# Patient Record
Sex: FEMALE | Race: BLACK OR AFRICAN AMERICAN | NOT HISPANIC OR LATINO | Employment: OTHER | URBAN - METROPOLITAN AREA
[De-identification: names, ages, dates, MRNs, and addresses within clinical notes are randomized per-mention and may not be internally consistent; named-entity substitution may affect disease eponyms.]

---

## 2021-11-22 ENCOUNTER — TELEPHONE (OUTPATIENT)
Dept: DIABETES | Facility: CLINIC | Age: 61
End: 2021-11-22
Payer: MEDICAID

## 2021-11-24 ENCOUNTER — TELEPHONE (OUTPATIENT)
Dept: DIABETES | Facility: CLINIC | Age: 61
End: 2021-11-24
Payer: MEDICAID

## 2021-12-09 ENCOUNTER — OFFICE VISIT (OUTPATIENT)
Dept: DIABETES | Facility: CLINIC | Age: 61
End: 2021-12-09
Payer: MEDICAID

## 2021-12-09 VITALS
HEIGHT: 62 IN | WEIGHT: 204.38 LBS | DIASTOLIC BLOOD PRESSURE: 93 MMHG | HEART RATE: 87 BPM | SYSTOLIC BLOOD PRESSURE: 196 MMHG | BODY MASS INDEX: 37.61 KG/M2

## 2021-12-09 DIAGNOSIS — E78.5 HYPERLIPIDEMIA WITH TARGET LDL LESS THAN 70: ICD-10-CM

## 2021-12-09 DIAGNOSIS — Z96.41 INSULIN PUMP IN PLACE: ICD-10-CM

## 2021-12-09 DIAGNOSIS — E03.9 HYPOTHYROIDISM, UNSPECIFIED TYPE: ICD-10-CM

## 2021-12-09 DIAGNOSIS — E11.65 UNCONTROLLED TYPE 2 DIABETES MELLITUS WITH HYPERGLYCEMIA, WITH LONG-TERM CURRENT USE OF INSULIN: Primary | ICD-10-CM

## 2021-12-09 DIAGNOSIS — I10 ESSENTIAL HYPERTENSION: ICD-10-CM

## 2021-12-09 DIAGNOSIS — E55.9 VITAMIN D DEFICIENCY: ICD-10-CM

## 2021-12-09 DIAGNOSIS — Z79.4 UNCONTROLLED TYPE 2 DIABETES MELLITUS WITH HYPERGLYCEMIA, WITH LONG-TERM CURRENT USE OF INSULIN: Primary | ICD-10-CM

## 2021-12-09 DIAGNOSIS — R74.8 ELEVATED LIVER ENZYMES: ICD-10-CM

## 2021-12-09 PROBLEM — J45.909 ASTHMA: Status: ACTIVE | Noted: 2021-12-09

## 2021-12-09 PROBLEM — J44.9 CHRONIC OBSTRUCTIVE PULMONARY DISEASE: Status: ACTIVE | Noted: 2018-02-15

## 2021-12-09 PROBLEM — E11.42 DIABETIC PERIPHERAL NEUROPATHY: Status: ACTIVE | Noted: 2018-02-15

## 2021-12-09 LAB — GLUCOSE SERPL-MCNC: 319 MG/DL (ref 70–110)

## 2021-12-09 PROCEDURE — 99999 PR PBB SHADOW E&M-EST. PATIENT-LVL IV: ICD-10-PCS | Mod: PBBFAC,,, | Performed by: NURSE PRACTITIONER

## 2021-12-09 PROCEDURE — 99999 PR PBB SHADOW E&M-EST. PATIENT-LVL IV: CPT | Mod: PBBFAC,,, | Performed by: NURSE PRACTITIONER

## 2021-12-09 PROCEDURE — 95251 PR GLUCOSE MONITOR, 72 HOUR, PHYS INTERP: ICD-10-PCS | Mod: ,,, | Performed by: NURSE PRACTITIONER

## 2021-12-09 PROCEDURE — 99214 OFFICE O/P EST MOD 30 MIN: CPT | Mod: PBBFAC | Performed by: NURSE PRACTITIONER

## 2021-12-09 PROCEDURE — 99215 PR OFFICE/OUTPT VISIT, EST, LEVL V, 40-54 MIN: ICD-10-PCS | Mod: S$PBB,,, | Performed by: NURSE PRACTITIONER

## 2021-12-09 PROCEDURE — 95251 CONT GLUC MNTR ANALYSIS I&R: CPT | Mod: ,,, | Performed by: NURSE PRACTITIONER

## 2021-12-09 PROCEDURE — 82962 GLUCOSE BLOOD TEST: CPT | Mod: PBBFAC | Performed by: NURSE PRACTITIONER

## 2021-12-09 PROCEDURE — 99215 OFFICE O/P EST HI 40 MIN: CPT | Mod: S$PBB,,, | Performed by: NURSE PRACTITIONER

## 2021-12-09 RX ORDER — PREGABALIN 75 MG/1
75 CAPSULE ORAL NIGHTLY
COMMUNITY
Start: 2021-10-28

## 2021-12-09 RX ORDER — INSULIN ASPART 100 [IU]/ML
INJECTION, SOLUTION INTRAVENOUS; SUBCUTANEOUS
COMMUNITY
Start: 2021-09-18 | End: 2021-12-09 | Stop reason: SDUPTHER

## 2021-12-09 RX ORDER — FLASH GLUCOSE SENSOR
KIT MISCELLANEOUS
COMMUNITY
End: 2023-10-05 | Stop reason: ALTCHOICE

## 2021-12-09 RX ORDER — CETIRIZINE HYDROCHLORIDE 5 MG/1
5 TABLET ORAL DAILY
COMMUNITY
Start: 2021-10-10

## 2021-12-09 RX ORDER — PROMETHAZINE HYDROCHLORIDE 25 MG/1
TABLET ORAL
COMMUNITY
Start: 2021-07-28

## 2021-12-09 RX ORDER — LINACLOTIDE 145 UG/1
145 CAPSULE, GELATIN COATED ORAL DAILY
COMMUNITY
Start: 2021-09-09

## 2021-12-09 RX ORDER — LEVOTHYROXINE SODIUM 25 UG/1
CAPSULE ORAL
COMMUNITY
Start: 2021-08-05 | End: 2021-12-09 | Stop reason: ALTCHOICE

## 2021-12-09 RX ORDER — ASPIRIN 81 MG/1
81 TABLET ORAL
COMMUNITY

## 2021-12-09 RX ORDER — TRIAMCINOLONE ACETONIDE 1 MG/G
CREAM TOPICAL
COMMUNITY
Start: 2021-11-03

## 2021-12-09 RX ORDER — LEVOTHYROXINE SODIUM 50 UG/1
50 TABLET ORAL
Qty: 30 TABLET | Refills: 5 | Status: SHIPPED | OUTPATIENT
Start: 2021-12-09 | End: 2022-03-11

## 2021-12-09 RX ORDER — DICLOFENAC SODIUM 10 MG/G
GEL TOPICAL
COMMUNITY
Start: 2021-11-18

## 2021-12-09 RX ORDER — LIDOCAINE 50 MG/G
PATCH TOPICAL
COMMUNITY
Start: 2021-10-28

## 2021-12-09 RX ORDER — RANOLAZINE 500 MG/1
500 TABLET, EXTENDED RELEASE ORAL 2 TIMES DAILY
COMMUNITY
Start: 2021-11-09

## 2021-12-09 RX ORDER — ATORVASTATIN CALCIUM 10 MG/1
10 TABLET, FILM COATED ORAL DAILY
COMMUNITY
Start: 2021-10-11 | End: 2022-01-11 | Stop reason: SDUPTHER

## 2021-12-09 RX ORDER — LOSARTAN POTASSIUM AND HYDROCHLOROTHIAZIDE 12.5; 5 MG/1; MG/1
1 TABLET ORAL DAILY
COMMUNITY
Start: 2021-10-03

## 2021-12-09 RX ORDER — HYDROXYZINE PAMOATE 50 MG/1
CAPSULE ORAL
COMMUNITY
Start: 2021-11-04

## 2021-12-09 RX ORDER — METOPROLOL SUCCINATE 25 MG/1
25 TABLET, EXTENDED RELEASE ORAL DAILY
COMMUNITY
Start: 2021-11-09

## 2021-12-09 RX ORDER — FLUTICASONE PROPIONATE 50 UG/1
1 POWDER, METERED RESPIRATORY (INHALATION)
COMMUNITY

## 2021-12-09 RX ORDER — BLOOD-GLUCOSE CONTROL, NORMAL
EACH MISCELLANEOUS
Qty: 100 EACH | Refills: 11 | Status: SHIPPED | OUTPATIENT
Start: 2021-12-09

## 2021-12-09 RX ORDER — CLOPIDOGREL BISULFATE 75 MG/1
75 TABLET ORAL DAILY
COMMUNITY
Start: 2021-11-09

## 2021-12-09 RX ORDER — HYDROGEN PEROXIDE 3 %
SOLUTION, NON-ORAL MISCELLANEOUS
COMMUNITY
Start: 2021-11-12

## 2021-12-09 RX ORDER — ALBUTEROL SULFATE 5 MG/ML
2.5 SOLUTION RESPIRATORY (INHALATION) EVERY 6 HOURS PRN
COMMUNITY

## 2021-12-09 RX ORDER — CYCLOBENZAPRINE HCL 10 MG
10 TABLET ORAL 3 TIMES DAILY
COMMUNITY
Start: 2021-11-01

## 2021-12-09 RX ORDER — ASPIRIN 325 MG
50000 TABLET, DELAYED RELEASE (ENTERIC COATED) ORAL
COMMUNITY
Start: 2021-11-09 | End: 2021-12-09 | Stop reason: SDUPTHER

## 2021-12-09 RX ORDER — TRAMADOL HYDROCHLORIDE 50 MG/1
50 TABLET ORAL 3 TIMES DAILY
COMMUNITY
Start: 2021-11-18

## 2021-12-09 RX ORDER — TIOTROPIUM BROMIDE 18 UG/1
1 CAPSULE ORAL; RESPIRATORY (INHALATION) DAILY
COMMUNITY
Start: 2021-11-09

## 2021-12-09 RX ORDER — FLUTICASONE PROPIONATE AND SALMETEROL XINAFOATE 230; 21 UG/1; UG/1
AEROSOL, METERED RESPIRATORY (INHALATION)
COMMUNITY
Start: 2021-11-10

## 2021-12-09 RX ORDER — AMLODIPINE BESYLATE 5 MG/1
5 TABLET ORAL DAILY
COMMUNITY
Start: 2021-10-27 | End: 2023-10-05 | Stop reason: ALTCHOICE

## 2021-12-09 RX ORDER — PANTOPRAZOLE SODIUM 40 MG/1
40 TABLET, DELAYED RELEASE ORAL DAILY
COMMUNITY
Start: 2021-11-13 | End: 2024-02-20 | Stop reason: CLARIF

## 2021-12-09 RX ORDER — ASPIRIN 325 MG
50000 TABLET, DELAYED RELEASE (ENTERIC COATED) ORAL
Qty: 12 CAPSULE | Refills: 1 | Status: SHIPPED | OUTPATIENT
Start: 2021-12-09 | End: 2022-08-19 | Stop reason: SDUPTHER

## 2021-12-09 RX ORDER — BLOOD-GLUCOSE CONTROL, NORMAL
1 EACH MISCELLANEOUS
COMMUNITY
Start: 2021-01-26 | End: 2021-12-09 | Stop reason: ALTCHOICE

## 2021-12-09 RX ORDER — INSULIN ASPART 100 [IU]/ML
INJECTION, SOLUTION INTRAVENOUS; SUBCUTANEOUS
Qty: 40 ML | Refills: 5 | Status: SHIPPED | OUTPATIENT
Start: 2021-12-09 | End: 2022-01-25 | Stop reason: SDUPTHER

## 2022-01-11 DIAGNOSIS — E78.5 HYPERLIPIDEMIA WITH TARGET LDL LESS THAN 70: Primary | ICD-10-CM

## 2022-01-11 RX ORDER — ATORVASTATIN CALCIUM 10 MG/1
10 TABLET, FILM COATED ORAL DAILY
Qty: 90 TABLET | Refills: 1 | Status: SHIPPED | OUTPATIENT
Start: 2022-01-11 | End: 2022-06-27

## 2022-01-20 ENCOUNTER — TELEPHONE (OUTPATIENT)
Dept: DIABETES | Facility: CLINIC | Age: 62
End: 2022-01-20
Payer: MEDICAID

## 2022-01-20 NOTE — TELEPHONE ENCOUNTER
----- Message from Flo John sent at 1/20/2022  7:55 AM CST -----  Contact: JD WHITLEY [[id number]]  ./Type:  Needs Medical Advice    Who Called: JD WHITLEY [[id number]]  Symptoms (please be specific):   How long has patient had these symptoms:   Pharmacy name and phone #:   Would the patient rather a call back or a response via [facility]?   Both     Best Call Back Number:   [phone] (home) [phone] (work)  Additional Information:  Pt is requesting  a call back from the nurse in regards to the pt knowing  if her lab orders were sent to [facility] pt is on  her way to the lab now please

## 2022-01-25 DIAGNOSIS — Z79.4 UNCONTROLLED TYPE 2 DIABETES MELLITUS WITH HYPERGLYCEMIA, WITH LONG-TERM CURRENT USE OF INSULIN: ICD-10-CM

## 2022-01-25 DIAGNOSIS — E11.65 UNCONTROLLED TYPE 2 DIABETES MELLITUS WITH HYPERGLYCEMIA, WITH LONG-TERM CURRENT USE OF INSULIN: ICD-10-CM

## 2022-01-25 RX ORDER — INSULIN ASPART 100 [IU]/ML
INJECTION, SOLUTION INTRAVENOUS; SUBCUTANEOUS
Qty: 40 ML | Refills: 5 | Status: SHIPPED | OUTPATIENT
Start: 2022-01-25 | End: 2023-02-22

## 2022-01-26 ENCOUNTER — PATIENT MESSAGE (OUTPATIENT)
Dept: DIABETES | Facility: CLINIC | Age: 62
End: 2022-01-26
Payer: MEDICAID

## 2022-03-07 ENCOUNTER — PATIENT MESSAGE (OUTPATIENT)
Dept: DIABETES | Facility: CLINIC | Age: 62
End: 2022-03-07
Payer: MEDICAID

## 2022-03-07 DIAGNOSIS — Z96.41 INSULIN PUMP IN PLACE: ICD-10-CM

## 2022-03-07 DIAGNOSIS — Z79.4 UNCONTROLLED TYPE 2 DIABETES MELLITUS WITH HYPERGLYCEMIA, WITH LONG-TERM CURRENT USE OF INSULIN: Primary | ICD-10-CM

## 2022-03-07 DIAGNOSIS — E03.9 HYPOTHYROIDISM, UNSPECIFIED TYPE: ICD-10-CM

## 2022-03-07 DIAGNOSIS — E11.65 UNCONTROLLED TYPE 2 DIABETES MELLITUS WITH HYPERGLYCEMIA, WITH LONG-TERM CURRENT USE OF INSULIN: Primary | ICD-10-CM

## 2022-03-07 DIAGNOSIS — R74.8 ELEVATED LIVER ENZYMES: ICD-10-CM

## 2022-03-07 DIAGNOSIS — E78.5 HYPERLIPIDEMIA WITH TARGET LDL LESS THAN 70: ICD-10-CM

## 2022-03-07 DIAGNOSIS — I10 ESSENTIAL HYPERTENSION: ICD-10-CM

## 2022-03-07 DIAGNOSIS — E55.9 VITAMIN D DEFICIENCY: ICD-10-CM

## 2022-03-09 ENCOUNTER — TELEPHONE (OUTPATIENT)
Dept: DIABETES | Facility: CLINIC | Age: 62
End: 2022-03-09
Payer: MEDICAID

## 2022-03-09 ENCOUNTER — OFFICE VISIT (OUTPATIENT)
Dept: DIABETES | Facility: CLINIC | Age: 62
End: 2022-03-09
Payer: MEDICAID

## 2022-03-09 ENCOUNTER — PATIENT MESSAGE (OUTPATIENT)
Dept: DIABETES | Facility: CLINIC | Age: 62
End: 2022-03-09

## 2022-03-09 DIAGNOSIS — R74.8 ELEVATED LIVER ENZYMES: ICD-10-CM

## 2022-03-09 DIAGNOSIS — Z79.4 UNCONTROLLED TYPE 2 DIABETES MELLITUS WITH HYPERGLYCEMIA, WITH LONG-TERM CURRENT USE OF INSULIN: Primary | ICD-10-CM

## 2022-03-09 DIAGNOSIS — E78.5 HYPERLIPIDEMIA WITH TARGET LDL LESS THAN 70: ICD-10-CM

## 2022-03-09 DIAGNOSIS — E03.9 HYPOTHYROIDISM, UNSPECIFIED TYPE: ICD-10-CM

## 2022-03-09 DIAGNOSIS — E11.65 UNCONTROLLED TYPE 2 DIABETES MELLITUS WITH HYPERGLYCEMIA, WITH LONG-TERM CURRENT USE OF INSULIN: Primary | ICD-10-CM

## 2022-03-09 DIAGNOSIS — I10 ESSENTIAL HYPERTENSION: ICD-10-CM

## 2022-03-09 DIAGNOSIS — E55.9 VITAMIN D DEFICIENCY: ICD-10-CM

## 2022-03-09 DIAGNOSIS — Z96.41 INSULIN PUMP IN PLACE: ICD-10-CM

## 2022-03-09 PROCEDURE — 1160F RVW MEDS BY RX/DR IN RCRD: CPT | Mod: CPTII,95,, | Performed by: NURSE PRACTITIONER

## 2022-03-09 PROCEDURE — 99214 OFFICE O/P EST MOD 30 MIN: CPT | Mod: 95,,, | Performed by: NURSE PRACTITIONER

## 2022-03-09 PROCEDURE — 1159F MED LIST DOCD IN RCRD: CPT | Mod: CPTII,95,, | Performed by: NURSE PRACTITIONER

## 2022-03-09 PROCEDURE — 1159F PR MEDICATION LIST DOCUMENTED IN MEDICAL RECORD: ICD-10-PCS | Mod: CPTII,95,, | Performed by: NURSE PRACTITIONER

## 2022-03-09 PROCEDURE — 3052F HG A1C>EQUAL 8.0%<EQUAL 9.0%: CPT | Mod: CPTII,95,, | Performed by: NURSE PRACTITIONER

## 2022-03-09 PROCEDURE — 3052F PR MOST RECENT HEMOGLOBIN A1C LEVEL 8.0 - < 9.0%: ICD-10-PCS | Mod: CPTII,95,, | Performed by: NURSE PRACTITIONER

## 2022-03-09 PROCEDURE — 1160F PR REVIEW ALL MEDS BY PRESCRIBER/CLIN PHARMACIST DOCUMENTED: ICD-10-PCS | Mod: CPTII,95,, | Performed by: NURSE PRACTITIONER

## 2022-03-09 PROCEDURE — 99214 PR OFFICE/OUTPT VISIT, EST, LEVL IV, 30-39 MIN: ICD-10-PCS | Mod: 95,,, | Performed by: NURSE PRACTITIONER

## 2022-03-09 RX ORDER — INSULIN GLARGINE 100 [IU]/ML
INJECTION, SOLUTION SUBCUTANEOUS
Qty: 15 ML | Refills: 5 | Status: SHIPPED | OUTPATIENT
Start: 2022-03-09

## 2022-03-09 RX ORDER — PEN NEEDLE, DIABETIC 30 GX3/16"
NEEDLE, DISPOSABLE MISCELLANEOUS
Qty: 50 EACH | Refills: 2 | Status: SHIPPED | OUTPATIENT
Start: 2022-03-09 | End: 2023-10-05 | Stop reason: SDUPTHER

## 2022-03-09 RX ORDER — SYRINGE AND NEEDLE,INSULIN,1ML 31GX15/64"
SYRINGE, EMPTY DISPOSABLE MISCELLANEOUS
Qty: 100 EACH | Refills: 2 | Status: SHIPPED | OUTPATIENT
Start: 2022-03-09

## 2022-03-09 NOTE — PROGRESS NOTES
The patient location is: [place], [place]  The chief complaint leading to consultation is: diabetes management follow up     Visit type: audiovisual    Face to Face time with patient: 15 minutes  30 minutes of total time spent on the encounter, which includes face to face time and non-face to face time preparing to see the patient (eg, review of tests), Obtaining and/or reviewing separately obtained history, Documenting clinical information in the electronic or other health record, Independently interpreting results (not separately reported) and communicating results to the patient/family/caregiver, or Care coordination (not separately reported).     Each patient to whom he or she provides medical services by telemedicine is:  (1) informed of the relationship between the physician and patient and the respective role of any other health care provider with respect to management of the patient; and (2) notified that he or she may decline to receive medical services by telemedicine and may withdraw from such care at any time.    Notes:     Subjective:         Patient ID: Victoria Plaza is a 61 y.o. female.  Patient's current PCP is [facility]     Chief Complaint: Diabetes Mellitus    HPI  Victoria Plaza is a 61 y.o. Black or  female presenting for a new consult with me, previously seen by myself at [facility] for diabetes. Patient has been diagnosed with type 2 diabetes since age 34 .    Comprehensive diabetes education completed through [facility] 2018.    CURRENT DM MEDICATIONS:   Novolog per Tandem TSlim x2 insulin pump- since 09/2018 (Supplies-[facility])  12a: 2.3   6a: 2.3  12p- 1.8  2p: 2.3  ISF   12a: 28  6a: 25  12p: 25  2p: 28  Carb ratio  12a: 3.6  6a: 3.6  12p: 3.4   2p: 3.4   Target 150   IOB 4 hours    Past failed treatment include: Humulin R,Lantus,Basaglar - Failure to control DM    Blood glucose testing  Continuous per Stu/True Metrix  Preferred lab: [facility]     Any episodes of  hypoglycemia? 0% per pump download    Complications related to diabetes: nephropathy, retinopathy, autonomic neuropathy and peripheral neuropathy    Her blood sugar in the clinic today was:   Lab Results   Component Value Date    POCGLU 319 (A) 12/09/2021     Victoria Plaza presents today for follow up visit to discuss diabetes management. No Stu download for this visit - her daughter who is also on the video call will re-try later today after her computer finishes updating. Per pump download, for the last 2 weeks average glucose of 265 mg/dL with a glucose range of  mg/dL. Basal needs account for 63%, food bolus 23%, and correction bolus needs 14%. She enters her BGs on average 3.2 times/day. Changing site/cartridge/tubing every 1-1.5 days. High glycemic variability continues. Corrections when given through the pump are not returning BGs to goal- plan to adjust ISF today. She did complete her blood work this AM at [facility]- results not ready at time of video visit.    She historically has declined Dexcom that communicates with her pump.    CKD II- Followed by renal routinely, Dr. Townsend. Kidney function stable.    GI -Gastroparesis- Dr. Jb Hyde. On Reglan. Chronic intermittent nausea and vomiting.     Vitamin D deficiency- Taking 50,000 international units twice monthly. Vitamin D level stable.    Hyperlipidemia-followed by cardiology, Dr. Sanches. She takes Lipitor 10 mg daily.     Hypothyroidism- Taking Synthroid 50 mcg daily- increased at last visit. Await lab work re-check.    Current diet: Carb counting  Activity Level: No structured exercise- limited due to chronic angina and COPD    Lab Results   Component Value Date    HGBA1C 8.6 (H) 09/01/2021    HGBA1C 10.0 (H) 05/11/2021    HGBA1C 9.2 (H) 12/10/2020     STANDARDS OF CARE  Diabetes Management Status    Statin: Taking  ACE/ARB: Taking    Screening or Prevention Patient's value Goal Complete/Controlled?   HgA1C Testing and Control   Lab Results    Component Value Date    HGBA1C 8.6 (H) 09/01/2021      Annually/Less than 8% No   Lipid profile : 09/01/2021 Annually No   LDL control No results found for: LDLCALC Annually/Less than 100 mg/dl  No   Nephropathy screening No results found for: LABMICR  No results found for: PROTEINUA  No results found for: UTPCR   Annually No   Blood pressure BP Readings from Last 1 Encounters:   12/09/21 (!) 196/93    Less than 140/90 No   Dilated retinal exam : 01/04/2022 Annually Yes   Foot exam   : 12/09/2021 Annually Yes       Labs reviewed and are noted below. She completed labs this morning -results not ready for time of visit.    No results found for: WBC, HGB, HCT, PLT, CHOL, TRIG, HDL, LDLCALC, ALT, AST, NA, K, CL, ANIONGAP, CREATININE, ESTGFRAFRICA, EGFRNONAA, BUN, CO2, TSH, PSA, INR, GLU, UTPCR, MICROALBUR  No results found for: GLUTAMICACID, CPEPTIDE, T3FREE, FREET4, TSH, THYROPEROXID, THYGLBTUM, THGABSCRN, TESTOSTERONE, TOTALTESTOST, IRON, TIBC, FERRITIN, SATURATEDIRO, QBNPCLRM36, PTH, CALCIUM, CAION, PHOS  No results found for: CPEPTIDE  No results found for: GLUTAMICACID  No results found for: GLU, ANIONGAP, ESTGFRAFRICA, EGFRNONAA    The following portions of the patient's history were reviewed and updated as appropriate: allergies, current medications, past family history, past medical history, past social history, past surgical history and problem list.    Review of patient's allergies indicates:   Allergen Reactions    Cephalexin Itching and Shortness Of Breath    Iodine and iodide containing products Hives, Itching and Shortness Of Breath    Latex, natural rubber Hives, Itching, Shortness Of Breath and Swelling     BREAKS OUT IN RASH      Monosodium glutamate Hives, Itching, Shortness Of Breath and Swelling     CAN'T BREATHE      Propofol analogues Shortness Of Breath    Adhesive Hives and Itching     Social History     Socioeconomic History    Marital status: Single   Tobacco Use    Smoking status:  "Never Smoker    Smokeless tobacco: Never Used   Substance and Sexual Activity    Alcohol use: Not Currently    Drug use: Never    Sexual activity: Not Currently     History reviewed. No pertinent past medical history.    REVIEW OF SYSTEMS:  Eyes History of stable DR.   Cardiovascular: History of HTN and hyperlipidemia.  GI: History of gastroparesis.  Neuro: Autonomic neuropathy.   PSYCH: No tobacco use.  ENDO: See HPI.        Objective:      There were no vitals filed for this visit.  RESPIRATORY: No respiratory distress  NEUROLOGIC: Cranial nerves II-XII grossly intact.   PSYCHIATRIC: Alert & oriented x3. Normal mood and affect.  FOOT: UTD    Assessment:       1. Uncontrolled type 2 diabetes mellitus with hyperglycemia, with long-term current use of insulin    2. Insulin pump in place    3. Hyperlipidemia with target LDL less than 70    4. Vitamin D deficiency    5. Elevated liver enzymes    6. Hypothyroidism, unspecified type    7. Essential hypertension        Plan:   Victoria was seen today for diabetes mellitus.    Diagnoses and all orders for this visit:    Uncontrolled type 2 diabetes mellitus with hyperglycemia, with long-term current use of insulin-Chronic  -     pen needle, diabetic (BD ULTRA-FINE JYOTI PEN NEEDLE) 32 gauge x 5/32" Ndle; In the event of pump failure only, for use with Lantus daily.  -     insulin syringe-needle U-100 0.3 mL 31 gauge x 15/64" Syrg; In the event of pump failure only, for use with Novolog 3-4 times daily.  -     insulin (LANTUS SOLOSTAR U-100 INSULIN) glargine 100 units/mL (3mL) SubQ pen; In the event of pump failure only, remove pump and start Lantus 60 units daily. Do not re-start pump until the time Lantus would have been due.    -ISF settings adjusted. Patient to try again to re-upload Stu data from home. Her daughter will work on this.     Pump Back Up Plan Only:    In the event of pump failure, remove your pump and start a long-acting insulin (such as Lantus, " "Basaglar, etc-whichever is preferred on your plan) at 60 units once daily. This will cover the loss of basal needs through your pump. Do not re-start your pump until the time your long-acting insulin would have been due.    Using a syringe, withdraw from Novolog to cover your meals and any high blood sugar corrections:     To determine how much to cover a meal or snack, total up your carbs and divide by 4 = how much to cover a meal or snack.    To determine how much to correct a high blood sugar: Take your current blood sugar and subtract your target blood sugar, 150. Then, divide by 25 = how much to correct a high blood sugar.    If you are eating and your blood sugar is high, add the totals together and take before the meal.    Insulin pump in place    New settings:    12a: 2.3   6a: 2.3  12p- 1.8  2p: 2.3  ISF   12a: 28  6a: 22  12p: 22  2p: 25  Carb ratio  12a: 3.6  6a: 3.6  12p: 3.4   2p: 3.4   Target 150   IOB 4 hours      Hyperlipidemia with target LDL less than 70-Chronic    -Continue Lipitor.    Vitamin D deficiency-Chronic    -Continue Vit D 50,000 International Units weekly.    Elevated liver enzymes-Chronic    -Continue to monitor.    Hypothyroidism, unspecified type-Chronic    -Continue Synthroid 50 mcg daily which was increased after last visit. Await lab work results.    Essential hypertension-Chronic    -BP not assessed for today's video visit. Continue current medications.    - Follow up: 3 months    I spent a total of 30 minutes on the day of the visit.This includes face to face time and non-face to face time preparing to see the patient (eg, review of tests), documenting clinical information in the electronic record, independently interpreting results and communicating results to the patient.    Portions of this note may have been created with voice recognition software. Occasional "wrong-word" or "sound-a-like" substitutions may have occurred due to the inherent limitations of voice recognition " software. Please, read the note carefully and recognize, using context, where substitutions have occurred.

## 2022-03-09 NOTE — PATIENT INSTRUCTIONS
We will be in touch regarding labs when available.     Follow-up in 3 months.    Pump Back Up Plan Only:    In the event of pump failure, remove your pump and start a long-acting insulin (such as Lantus, Basaglar, etc-whichever is preferred on your plan) at 60 units once daily. This will cover the loss of basal needs through your pump. Do not re-start your pump until the time your long-acting insulin would have been due.    Using a syringe, withdraw from Novolog to cover your meals and any high blood sugar corrections:     To determine how much to cover a meal or snack, total up your carbs and divide by 4 = how much to cover a meal or snack.    For example, you are having a 50 gram meal.  50 / 4 = 12.5. You would take 13 units to cover this meal.    To determine how much to correct a high blood sugar: Take your current blood sugar and subtract your target blood sugar, 150. Then, divide by 25 = how much to correct a high blood sugar.    For example, your starting blood sugar is 275.  275-150 = 125.  125/25 = 5 units. You would need 5 units to correct a high blood sugar of 275.    If you are eating and your blood sugar is high, add the totals together and take before the meal.    New pump settings:    Basal  12a: 2.3   6a: 2.3  12p- 1.8  2p: 2.5  ISF   12a: 28  6a: 22  12p: 22  2p: 25  Carb ratio  12a: 3.6  6a: 3.6  12p: 3.4   2p: 3.4   Target 150   IOB 4 hours

## 2022-03-09 NOTE — TELEPHONE ENCOUNTER
----- Message from Chapis Guzman NP sent at 3/9/2022 10:01 AM CST -----  When you call to schedule her 3 month follow up appt, also let her know I sent in her pump back up plan in the event of pump malfunction/failure to her local pharmacy on file. Instructions are available to her on her after visit summary which is available on [facility] and also on the prescriptions. She does not have to fill the medications unless she needs them (I sent in Lantus, pen needles, and insulin syringes). Again, this is for her pump back up plan only.

## 2022-03-11 ENCOUNTER — TELEPHONE (OUTPATIENT)
Dept: DIABETES | Facility: CLINIC | Age: 62
End: 2022-03-11
Payer: MEDICAID

## 2022-03-11 DIAGNOSIS — E03.9 HYPOTHYROIDISM, UNSPECIFIED TYPE: Primary | ICD-10-CM

## 2022-03-11 RX ORDER — LEVOTHYROXINE SODIUM 75 UG/1
75 TABLET ORAL
Qty: 30 TABLET | Refills: 11 | Status: SHIPPED | OUTPATIENT
Start: 2022-03-11 | End: 2022-06-08 | Stop reason: DRUGHIGH

## 2022-03-11 NOTE — TELEPHONE ENCOUNTER
Informed pt of below results. shes been eatting a lot of crawfish that's why cholesterol increased . Dont take lipitor every day and can see rx to [facility] on file.

## 2022-03-11 NOTE — TELEPHONE ENCOUNTER
----- Message from Chapis Guzman NP sent at 3/11/2022  8:57 AM CST -----  Let patient know we did receive her lab work. Her A1c is at 8.9%. This is slightly higher than previous at 8.6%. Hopefully the changes we made at last visit will help. Cholesterol panel with LDL at 150, more than previous but her triglycerides did improve. Has she ever tried an increased dose of Lipitor? Vitamin D,kidney function normal. Thyroid levels better but still not where I want them to be- I want to increase Levothyroxine to 75 mcg daily. Let me know when you talk to her and I'll send in the orders.

## 2022-04-11 LAB
25(OH)D3+25(OH)D2 SERPL-MCNC: 37.9 NG/ML (ref 30–100)
25(OH)D3+25(OH)D2 SERPL-MCNC: 44.5 NG/ML (ref 30–100)
ALBUMIN SERPL-MCNC: 3.8 G/DL (ref 3.8–4.8)
ALBUMIN SERPL-MCNC: 4.2 G/DL (ref 3.8–4.8)
ALBUMIN/CREAT UR: 71 MG/G CREAT (ref 0–29)
ALBUMIN/GLOB SERPL: 1.3 {RATIO} (ref 1.2–2.2)
ALBUMIN/GLOB SERPL: 1.3 {RATIO} (ref 1.2–2.2)
ALP SERPL-CCNC: 122 IU/L (ref 44–121)
ALP SERPL-CCNC: 172 IU/L (ref 44–121)
ALT SERPL-CCNC: 30 IU/L (ref 0–32)
ALT SERPL-CCNC: 58 IU/L (ref 0–32)
AST SERPL-CCNC: 19 IU/L (ref 0–40)
AST SERPL-CCNC: 30 IU/L (ref 0–40)
BILIRUB SERPL-MCNC: 0.3 MG/DL (ref 0–1.2)
BILIRUB SERPL-MCNC: 0.4 MG/DL (ref 0–1.2)
BUN SERPL-MCNC: 14 MG/DL (ref 8–27)
BUN SERPL-MCNC: 14 MG/DL (ref 8–27)
BUN/CREAT SERPL: 15 (ref 12–28)
BUN/CREAT SERPL: 16 (ref 12–28)
CALCIUM SERPL-MCNC: 8.8 MG/DL (ref 8.7–10.3)
CALCIUM SERPL-MCNC: 9.3 MG/DL (ref 8.7–10.3)
CHLORIDE SERPL-SCNC: 101 MMOL/L (ref 96–106)
CHLORIDE SERPL-SCNC: 103 MMOL/L (ref 96–106)
CHOLEST SERPL-MCNC: 218 MG/DL (ref 100–199)
CHOLEST SERPL-MCNC: 249 MG/DL (ref 100–199)
CO2 SERPL-SCNC: 19 MMOL/L (ref 20–29)
CO2 SERPL-SCNC: 20 MMOL/L (ref 20–29)
CREAT SERPL-MCNC: 0.9 MG/DL (ref 0.57–1)
CREAT SERPL-MCNC: 0.91 MG/DL (ref 0.57–1)
CREAT UR-MCNC: 129 MG/DL
EST. GFR  (NO RACE VARIABLE): 71 ML/MIN/1.73
GLOBULIN SER CALC-MCNC: 2.9 G/DL (ref 1.5–4.5)
GLOBULIN SER CALC-MCNC: 3.3 G/DL (ref 1.5–4.5)
GLUCOSE SERPL-MCNC: 209 MG/DL (ref 65–99)
GLUCOSE SERPL-MCNC: 364 MG/DL (ref 65–99)
HBA1C MFR BLD: 8.9 % (ref 4.8–5.6)
HBA1C MFR BLD: 9 % (ref 4.8–5.6)
HDLC SERPL-MCNC: 47 MG/DL
HDLC SERPL-MCNC: 65 MG/DL
LDLC SERPL CALC-MCNC: 150 MG/DL (ref 0–99)
LDLC SERPL CALC-MCNC: 171 MG/DL (ref 0–99)
MICROALBUMIN UR-MCNC: 91.6 UG/ML
POTASSIUM SERPL-SCNC: 4.2 MMOL/L (ref 3.5–5.2)
POTASSIUM SERPL-SCNC: 4.9 MMOL/L (ref 3.5–5.2)
PROT SERPL-MCNC: 6.7 G/DL (ref 6–8.5)
PROT SERPL-MCNC: 7.5 G/DL (ref 6–8.5)
SODIUM SERPL-SCNC: 138 MMOL/L (ref 134–144)
SODIUM SERPL-SCNC: 141 MMOL/L (ref 134–144)
T4 FREE SERPL-MCNC: 0.91 NG/DL (ref 0.82–1.77)
T4 FREE SERPL-MCNC: 1.08 NG/DL (ref 0.82–1.77)
THYROPEROXIDASE AB SERPL-ACNC: <8 IU/ML (ref 0–34)
THYROPEROXIDASE AB SERPL-ACNC: <8 IU/ML (ref 0–34)
TRIGL SERPL-MCNC: 117 MG/DL (ref 0–149)
TRIGL SERPL-MCNC: 79 MG/DL (ref 0–149)
TSH SERPL DL<=0.005 MIU/L-ACNC: 2.66 UIU/ML (ref 0.45–4.5)
TSH SERPL DL<=0.005 MIU/L-ACNC: 7.52 UIU/ML (ref 0.45–4.5)
VLDLC SERPL CALC-MCNC: 13 MG/DL (ref 5–40)
VLDLC SERPL CALC-MCNC: 21 MG/DL (ref 5–40)

## 2022-05-16 ENCOUNTER — PATIENT MESSAGE (OUTPATIENT)
Dept: DIABETES | Facility: CLINIC | Age: 62
End: 2022-05-16
Payer: MEDICAID

## 2022-05-16 DIAGNOSIS — E78.5 HYPERLIPIDEMIA WITH TARGET LDL LESS THAN 70: ICD-10-CM

## 2022-05-16 DIAGNOSIS — E55.9 VITAMIN D DEFICIENCY: ICD-10-CM

## 2022-05-16 DIAGNOSIS — R74.8 ELEVATED LIVER ENZYMES: ICD-10-CM

## 2022-05-16 DIAGNOSIS — Z96.41 INSULIN PUMP IN PLACE: ICD-10-CM

## 2022-05-16 DIAGNOSIS — I10 ESSENTIAL HYPERTENSION: ICD-10-CM

## 2022-05-16 DIAGNOSIS — E03.9 HYPOTHYROIDISM, UNSPECIFIED TYPE: ICD-10-CM

## 2022-05-16 DIAGNOSIS — E11.65 UNCONTROLLED TYPE 2 DIABETES MELLITUS WITH HYPERGLYCEMIA, WITH LONG-TERM CURRENT USE OF INSULIN: Primary | ICD-10-CM

## 2022-05-16 DIAGNOSIS — Z79.4 UNCONTROLLED TYPE 2 DIABETES MELLITUS WITH HYPERGLYCEMIA, WITH LONG-TERM CURRENT USE OF INSULIN: Primary | ICD-10-CM

## 2022-05-26 LAB
ALBUMIN SERPL-MCNC: 4.2 G/DL (ref 3.8–4.8)
ALBUMIN/GLOB SERPL: 1.4 {RATIO} (ref 1.2–2.2)
ALP SERPL-CCNC: 155 IU/L (ref 44–121)
ALT SERPL-CCNC: 24 IU/L (ref 0–32)
AST SERPL-CCNC: 11 IU/L (ref 0–40)
BILIRUB SERPL-MCNC: 0.2 MG/DL (ref 0–1.2)
BUN SERPL-MCNC: 20 MG/DL (ref 8–27)
BUN/CREAT SERPL: 19 (ref 12–28)
CALCIUM SERPL-MCNC: 9.4 MG/DL (ref 8.7–10.3)
CHLORIDE SERPL-SCNC: 101 MMOL/L (ref 96–106)
CHOLEST SERPL-MCNC: 218 MG/DL (ref 100–199)
CO2 SERPL-SCNC: 20 MMOL/L (ref 20–29)
CREAT SERPL-MCNC: 1.03 MG/DL (ref 0.57–1)
EST. GFR  (NO RACE VARIABLE): 61 ML/MIN/1.73
GLOBULIN SER CALC-MCNC: 3.1 G/DL (ref 1.5–4.5)
GLUCOSE SERPL-MCNC: 171 MG/DL (ref 65–99)
HBA1C MFR BLD: 10.1 % (ref 4.8–5.6)
HDLC SERPL-MCNC: 54 MG/DL
LDLC SERPL CALC-MCNC: 146 MG/DL (ref 0–99)
POTASSIUM SERPL-SCNC: 4.3 MMOL/L (ref 3.5–5.2)
PROT SERPL-MCNC: 7.3 G/DL (ref 6–8.5)
SODIUM SERPL-SCNC: 139 MMOL/L (ref 134–144)
T4 FREE SERPL-MCNC: 1.33 NG/DL (ref 0.82–1.77)
TRIGL SERPL-MCNC: 103 MG/DL (ref 0–149)
TSH SERPL DL<=0.005 MIU/L-ACNC: 4.61 UIU/ML (ref 0.45–4.5)
VLDLC SERPL CALC-MCNC: 18 MG/DL (ref 5–40)

## 2022-05-31 ENCOUNTER — TELEPHONE (OUTPATIENT)
Dept: DIABETES | Facility: CLINIC | Age: 62
End: 2022-05-31
Payer: MEDICAID

## 2022-05-31 NOTE — TELEPHONE ENCOUNTER
----- Message from Chapis Guzman NP sent at 5/31/2022 12:50 PM CDT -----  We'll discuss labs at upcoming visit - mild elevation of the alkaline phosphatase, a little higher than previous. Cholesterol panel about the same.  A1c is worse at 10.1%. Thyroid level is improving.

## 2022-05-31 NOTE — PROGRESS NOTES
We'll discuss labs at upcoming visit - mild elevation of the alkaline phosphatase, a little higher than previous. Cholesterol panel about the same.  A1c is worse at 10.1%. Thyroid level is improving.

## 2022-06-08 ENCOUNTER — OFFICE VISIT (OUTPATIENT)
Dept: DIABETES | Facility: CLINIC | Age: 62
End: 2022-06-08
Payer: MEDICAID

## 2022-06-08 DIAGNOSIS — E78.5 HYPERLIPIDEMIA WITH TARGET LDL LESS THAN 70: ICD-10-CM

## 2022-06-08 DIAGNOSIS — Z79.4 UNCONTROLLED TYPE 2 DIABETES MELLITUS WITH HYPERGLYCEMIA, WITH LONG-TERM CURRENT USE OF INSULIN: Primary | ICD-10-CM

## 2022-06-08 DIAGNOSIS — R74.8 ELEVATED ALKALINE PHOSPHATASE LEVEL: ICD-10-CM

## 2022-06-08 DIAGNOSIS — Z96.41 INSULIN PUMP IN PLACE: ICD-10-CM

## 2022-06-08 DIAGNOSIS — E11.65 UNCONTROLLED TYPE 2 DIABETES MELLITUS WITH HYPERGLYCEMIA, WITH LONG-TERM CURRENT USE OF INSULIN: Primary | ICD-10-CM

## 2022-06-08 DIAGNOSIS — E03.9 HYPOTHYROIDISM, UNSPECIFIED TYPE: ICD-10-CM

## 2022-06-08 DIAGNOSIS — R47.81 SLURRED SPEECH: ICD-10-CM

## 2022-06-08 PROCEDURE — 3060F PR POS MICROALBUMINURIA RESULT DOCUMENTED/REVIEW: ICD-10-PCS | Mod: CPTII,95,, | Performed by: NURSE PRACTITIONER

## 2022-06-08 PROCEDURE — 1160F RVW MEDS BY RX/DR IN RCRD: CPT | Mod: CPTII,95,, | Performed by: NURSE PRACTITIONER

## 2022-06-08 PROCEDURE — 3066F PR DOCUMENTATION OF TREATMENT FOR NEPHROPATHY: ICD-10-PCS | Mod: CPTII,95,, | Performed by: NURSE PRACTITIONER

## 2022-06-08 PROCEDURE — 1159F PR MEDICATION LIST DOCUMENTED IN MEDICAL RECORD: ICD-10-PCS | Mod: CPTII,95,, | Performed by: NURSE PRACTITIONER

## 2022-06-08 PROCEDURE — 3060F POS MICROALBUMINURIA REV: CPT | Mod: CPTII,95,, | Performed by: NURSE PRACTITIONER

## 2022-06-08 PROCEDURE — 3066F NEPHROPATHY DOC TX: CPT | Mod: CPTII,95,, | Performed by: NURSE PRACTITIONER

## 2022-06-08 PROCEDURE — 99214 OFFICE O/P EST MOD 30 MIN: CPT | Mod: 95,,, | Performed by: NURSE PRACTITIONER

## 2022-06-08 PROCEDURE — 1160F PR REVIEW ALL MEDS BY PRESCRIBER/CLIN PHARMACIST DOCUMENTED: ICD-10-PCS | Mod: CPTII,95,, | Performed by: NURSE PRACTITIONER

## 2022-06-08 PROCEDURE — 99214 PR OFFICE/OUTPT VISIT, EST, LEVL IV, 30-39 MIN: ICD-10-PCS | Mod: 95,,, | Performed by: NURSE PRACTITIONER

## 2022-06-08 PROCEDURE — 1159F MED LIST DOCD IN RCRD: CPT | Mod: CPTII,95,, | Performed by: NURSE PRACTITIONER

## 2022-06-08 RX ORDER — LEVOTHYROXINE SODIUM 88 UG/1
88 TABLET ORAL
Qty: 30 TABLET | Refills: 5 | Status: SHIPPED | OUTPATIENT
Start: 2022-06-08 | End: 2023-06-23

## 2022-06-08 NOTE — PROGRESS NOTES
The patient location is: [place], [place]  The chief complaint leading to consultation is: diabetes management follow up     Visit type: audiovisual    Face to Face time with patient: 15 minutes  30 minutes of total time spent on the encounter, which includes face to face time and non-face to face time preparing to see the patient (eg, review of tests), Obtaining and/or reviewing separately obtained history, Documenting clinical information in the electronic or other health record, Independently interpreting results (not separately reported) and communicating results to the patient/family/caregiver, or Care coordination (not separately reported).     Each patient to whom he or she provides medical services by telemedicine is:  (1) informed of the relationship between the physician and patient and the respective role of any other health care provider with respect to management of the patient; and (2) notified that he or she may decline to receive medical services by telemedicine and may withdraw from such care at any time.    Notes:     Subjective:         Patient ID: Victoria Plaza is a 61 y.o. female.  Patient's current PCP is [facility]     Chief Complaint: Diabetes Mellitus    HPI  Victoria Plaza is a 61 y.o. Black or  female presenting for a follow up for diabetes. Patient has been diagnosed with type 2 diabetes since age 34 .    Comprehensive diabetes education completed through [facility] 2018.    CURRENT DM MEDICATIONS:   Novolog per Tandem TSlim x2 insulin pump- since 09/2018 (Supplies[facility])  Basal  12a: 2.3   6a: 2.3  12p- 1.8  2p: 2.3  ISF   12a: 28  6a: 22  12p: 22  2p: 25  Carb ratio  12a: 3.6  6a: 3.6  12p: 3.4   2p: 3.4   Target 150   IOB 4 hours    Past failed treatment include: Humulin R,Lantus,Basaglar - Failure to control DM    Blood glucose testing  Continuous per Stu/True Metrix  Preferred lab: [facility]     Any episodes of hypoglycemia? Denies    Complications related to  "diabetes: nephropathy, retinopathy, autonomic neuropathy and peripheral neuropathy    Her blood sugar in the clinic today was:   Lab Results   Component Value Date    POCGLU 319 (A) 12/09/2021     Victoria Plaza presents today for follow up visit to discuss diabetes management. No Stu download for this visit - currently her laptop was updated and she needs to re-set passwords. Feels BGs have been higher. She has not been eating right -gaining weight also. She feels the thyroid medication is to blame. Reviewed hypothyroidism and that current TSH is still elevated- needs increase in dose.     She reports intermittent slurred speech/aphasia episodes. Describes as "out of body experience" when this happens-- Needs referral to see neurology.     Elevated alk phos- will re-check with next labs.    Vitals today, 6/8/2022:  BP: 168/86  blood sugar: 117   Weight: 202.6 lbs    She historically has declined Dexcom that communicates with her pump.    CKD II- Followed by renal routinely, Dr. Townsend.     GI -Gastroparesis- Dr. Jb Hyde. On Reglan. Chronic intermittent nausea and vomiting.  Has not seen recently.    Vitamin D deficiency- Taking 50,000 international units twice monthly. Vitamin D level stable.    Hyperlipidemia-followed by cardiology, Dr. Sanches. She takes Lipitor 10 mg daily.     Hypothyroidism- Taking Synthroid 50 mcg daily- increased at last visit. Await lab work re-check.    Current diet: Carb counting  Activity Level: No structured exercise- limited due to chronic angina and COPD    Lab Results   Component Value Date    HGBA1C 10.1 (H) 05/25/2022    HGBA1C 8.9 (H) 03/09/2022    HGBA1C 9.0 (H) 01/20/2022     STANDARDS OF CARE  Diabetes Management Status    Statin: Taking  ACE/ARB: Taking    Screening or Prevention Patient's value Goal Complete/Controlled?   HgA1C Testing and Control   Lab Results   Component Value Date    HGBA1C 10.1 (H) 05/25/2022      Annually/Less than 8% No   Lipid profile : " 05/25/2022 Annually No   LDL control Lab Results   Component Value Date    LDLCALC 146 (H) 05/25/2022    Annually/Less than 100 mg/dl  No   Nephropathy screening Lab Results   Component Value Date    LABMICR 71 (H) 03/09/2022     No results found for: PROTEINUA  No results found for: UTPCR   Annually No   Blood pressure BP Readings from Last 1 Encounters:   12/09/21 (!) 196/93    Less than 140/90 No   Dilated retinal exam : 01/04/2022 Annually Yes   Foot exam   : 12/09/2021 Annually Yes       Labs reviewed and are noted below. She completed labs this morning -results not ready for time of visit.    Lab Results   Component Value Date     12/06/2017    CHOL 218 (H) 05/25/2022    TRIG 103 05/25/2022    HDL 54 05/25/2022    LDLCALC 146 (H) 05/25/2022    ALT 24 05/25/2022    AST 11 05/25/2022     05/25/2022    K 4.3 05/25/2022     05/25/2022    CREATININE 1.03 (H) 05/25/2022    EGFRNONAA 69 01/20/2022    BUN 20 05/25/2022    CO2 20 05/25/2022    TSH 4.610 (H) 05/25/2022     (H) 05/25/2022    MICROALBUR 91.6 03/09/2022     Lab Results   Component Value Date    TSH 4.610 (H) 05/25/2022    CALCIUM 9.4 05/25/2022     No results found for: CPEPTIDE  No results found for: GLUTAMICACID  Glucose   Date Value Ref Range Status   05/25/2022 171 (H) 65 - 99 mg/dL Final     eGFR if non    Date Value Ref Range Status   01/20/2022 69 >59 mL/min/1.73 Final       The following portions of the patient's history were reviewed and updated as appropriate: allergies, current medications, past family history, past medical history, past social history, past surgical history and problem list.    Review of patient's allergies indicates:   Allergen Reactions    Cephalexin Itching and Shortness Of Breath    Iodine and iodide containing products Hives, Itching and Shortness Of Breath    Latex, natural rubber Hives, Itching, Shortness Of Breath and Swelling     BREAKS OUT IN RASH      Monosodium glutamate  Hives, Itching, Shortness Of Breath and Swelling     CAN'T BREATHE      Propofol analogues Shortness Of Breath    Adhesive Hives and Itching     Social History     Socioeconomic History    Marital status: Single   Tobacco Use    Smoking status: Never Smoker    Smokeless tobacco: Never Used   Substance and Sexual Activity    Alcohol use: Not Currently    Drug use: Never    Sexual activity: Not Currently     Past Medical History:   Diagnosis Date    Diabetes mellitus, type 2        REVIEW OF SYSTEMS:  Eyes History of stable DR.   Cardiovascular: History of HTN and hyperlipidemia.  GI: History of gastroparesis. History of elevated alk phos.  Neuro: Autonomic neuropathy. Issues with intermittent slurred speech/aphasia.  PSYCH: No tobacco use.  ENDO: See HPI.        Objective:      There were no vitals filed for this visit.   Patient self report of vitals:  BP: 168/86  blood sugar: 117   Weight: 202.6 lbs  RESPIRATORY: No respiratory distress  PSYCHIATRIC: Alert & oriented x3. Normal mood and affect.  FOOT: UTD    Assessment:       1. Uncontrolled type 2 diabetes mellitus with hyperglycemia, with long-term current use of insulin    2. Insulin pump in place    3. Hypothyroidism, unspecified type    4. Hyperlipidemia with target LDL less than 70    5. Elevated alkaline phosphatase level    6. Slurred speech        Plan:   Victoria was seen today for diabetes mellitus.    Diagnoses and all orders for this visit:    Uncontrolled type 2 diabetes mellitus with hyperglycemia, with long-term current use of insulin    Chronic - patient was not successful with downloading pump from home. Her daughter will continue to work on this and let me know when successful. She has to re-set passwords. Will wait to review data before recommending changes.    Insulin pump in place    Continue same settings - await review of pump download.    Hypothyroidism, unspecified type  -     TSH; Future  -     T4, Free; Future  -     TSH  -      "T4, Free  -     SYNTHROID 88 mcg tablet; Take 1 tablet (88 mcg total) by mouth before breakfast.    Chronic,Improving but TSH remains elevated. Recommend increasing Synthroid to 88 mcg daily and re-check TFTs in 6 weeks.    Hyperlipidemia with target LDL less than 70    Chronic-Continue Lipitor.    Elevated alkaline phosphatase level  -     ALKALINE PHOSPHATASE, ISOENZYMES; Future  -     Hepatic Function Panel; Future  -     ALKALINE PHOSPHATASE, ISOENZYMES  -     Hepatic Function Panel    Chronic,worsening- re-check with next labs in 6 weeks. May need re-referral to see GI as she has not seen her GI specialist anytime recently per report.    Slurred speech  -     Ambulatory referral/consult to Neurology; Future    Intermittent, describes as an out of body experience and difficulty expressing words correctly when this occurs. Referral to neurology.    - Follow up: 3 months    I spent a total of 30 minutes on the day of the visit.This includes face to face time and non-face to face time preparing to see the patient (eg, review of tests), documenting clinical information in the electronic record, independently interpreting results and communicating results to the patient.    Portions of this note may have been created with voice recognition software. Occasional "wrong-word" or "sound-a-like" substitutions may have occurred due to the inherent limitations of voice recognition software. Please, read the note carefully and recognize, using context, where substitutions have occurred.           Sheri Ammons,FNP-C [facility]  "

## 2022-06-14 ENCOUNTER — PATIENT MESSAGE (OUTPATIENT)
Dept: DIABETES | Facility: CLINIC | Age: 62
End: 2022-06-14
Payer: MEDICAID

## 2022-08-19 DIAGNOSIS — E11.65 UNCONTROLLED TYPE 2 DIABETES MELLITUS WITH HYPERGLYCEMIA, WITH LONG-TERM CURRENT USE OF INSULIN: ICD-10-CM

## 2022-08-19 DIAGNOSIS — Z79.4 UNCONTROLLED TYPE 2 DIABETES MELLITUS WITH HYPERGLYCEMIA, WITH LONG-TERM CURRENT USE OF INSULIN: ICD-10-CM

## 2022-08-19 RX ORDER — ASPIRIN 325 MG
50000 TABLET, DELAYED RELEASE (ENTERIC COATED) ORAL
Qty: 12 CAPSULE | Refills: 1 | Status: SHIPPED | OUTPATIENT
Start: 2022-08-19 | End: 2023-02-22

## 2022-09-19 ENCOUNTER — TELEPHONE (OUTPATIENT)
Dept: DIABETES | Facility: CLINIC | Age: 62
End: 2022-09-19
Payer: MEDICAID

## 2022-09-19 ENCOUNTER — PATIENT MESSAGE (OUTPATIENT)
Dept: DIABETES | Facility: CLINIC | Age: 62
End: 2022-09-19
Payer: MEDICAID

## 2022-09-19 NOTE — TELEPHONE ENCOUNTER
Returned pt call in regards to insurance. Pt daughter stated she will contact the clinic on tomorrow with copy of insurance letter.

## 2022-10-25 ENCOUNTER — PATIENT MESSAGE (OUTPATIENT)
Dept: DIABETES | Facility: CLINIC | Age: 62
End: 2022-10-25
Payer: MEDICAID

## 2022-10-25 ENCOUNTER — TELEPHONE (OUTPATIENT)
Dept: DIABETES | Facility: CLINIC | Age: 62
End: 2022-10-25
Payer: MEDICAID

## 2022-10-25 NOTE — TELEPHONE ENCOUNTER
----- Message from Argenis Allen sent at 10/25/2022  9:20 AM CDT -----  Contact: Fela Chahal is requesting  a called back in regards to the patient appointment and insulin pump. Please call her back at [phone]        Thanks  CF

## 2022-10-25 NOTE — TELEPHONE ENCOUNTER
I believe I already sent a message about this, but please assist with getting patient an appointment for follow up. Virtual is fine, let them know it is temporary for now and we'll be back to normal by January. Her daughter can assist with downloading pump ahead of visit. Thanks!

## 2022-10-26 ENCOUNTER — TELEPHONE (OUTPATIENT)
Dept: DIABETES | Facility: CLINIC | Age: 62
End: 2022-10-26
Payer: MEDICAID

## 2022-10-26 NOTE — TELEPHONE ENCOUNTER
----- Message from Chapis Guzman NP sent at 10/25/2022  2:33 PM CDT -----  Please assist with getting a patient a follow up appt with me -virtual. Her daughter can help her download before the visit. Thanks!

## 2022-12-20 ENCOUNTER — TELEPHONE (OUTPATIENT)
Dept: DIABETES | Facility: CLINIC | Age: 62
End: 2022-12-20
Payer: MEDICAID

## 2022-12-20 NOTE — TELEPHONE ENCOUNTER
----- Message from Niru Muhammad sent at 12/20/2022  9:43 AM CST -----  Pt was calling to schedule her follow up appt. Please call her back at .[phone]. Thx. EL

## 2022-12-20 NOTE — TELEPHONE ENCOUNTER
Called pt 3x to see when can I schedule her with Chapis Guzman. Pt did not answer and could not lvm for pt to call back.

## 2022-12-22 ENCOUNTER — TELEPHONE (OUTPATIENT)
Dept: DIABETES | Facility: CLINIC | Age: 62
End: 2022-12-22

## 2022-12-22 ENCOUNTER — OFFICE VISIT (OUTPATIENT)
Dept: DIABETES | Facility: CLINIC | Age: 62
End: 2022-12-22
Payer: MEDICAID

## 2022-12-22 DIAGNOSIS — Z79.4 UNCONTROLLED TYPE 2 DIABETES MELLITUS WITH HYPERGLYCEMIA, WITH LONG-TERM CURRENT USE OF INSULIN: Primary | ICD-10-CM

## 2022-12-22 DIAGNOSIS — E78.5 HYPERLIPIDEMIA WITH TARGET LDL LESS THAN 70: ICD-10-CM

## 2022-12-22 DIAGNOSIS — Z96.41 INSULIN PUMP IN PLACE: ICD-10-CM

## 2022-12-22 DIAGNOSIS — E11.65 UNCONTROLLED TYPE 2 DIABETES MELLITUS WITH HYPERGLYCEMIA, WITH LONG-TERM CURRENT USE OF INSULIN: Primary | ICD-10-CM

## 2022-12-22 DIAGNOSIS — E03.9 HYPOTHYROIDISM, UNSPECIFIED TYPE: ICD-10-CM

## 2022-12-22 PROCEDURE — 3046F PR MOST RECENT HEMOGLOBIN A1C LEVEL > 9.0%: ICD-10-PCS | Mod: CPTII,95,, | Performed by: NURSE PRACTITIONER

## 2022-12-22 PROCEDURE — 99213 OFFICE O/P EST LOW 20 MIN: CPT | Mod: 95,,, | Performed by: NURSE PRACTITIONER

## 2022-12-22 PROCEDURE — 3066F NEPHROPATHY DOC TX: CPT | Mod: CPTII,95,, | Performed by: NURSE PRACTITIONER

## 2022-12-22 PROCEDURE — 1159F PR MEDICATION LIST DOCUMENTED IN MEDICAL RECORD: ICD-10-PCS | Mod: CPTII,95,, | Performed by: NURSE PRACTITIONER

## 2022-12-22 PROCEDURE — 3060F POS MICROALBUMINURIA REV: CPT | Mod: CPTII,95,, | Performed by: NURSE PRACTITIONER

## 2022-12-22 PROCEDURE — 3060F PR POS MICROALBUMINURIA RESULT DOCUMENTED/REVIEW: ICD-10-PCS | Mod: CPTII,95,, | Performed by: NURSE PRACTITIONER

## 2022-12-22 PROCEDURE — 1159F MED LIST DOCD IN RCRD: CPT | Mod: CPTII,95,, | Performed by: NURSE PRACTITIONER

## 2022-12-22 PROCEDURE — 99213 PR OFFICE/OUTPT VISIT, EST, LEVL III, 20-29 MIN: ICD-10-PCS | Mod: 95,,, | Performed by: NURSE PRACTITIONER

## 2022-12-22 PROCEDURE — 1160F PR REVIEW ALL MEDS BY PRESCRIBER/CLIN PHARMACIST DOCUMENTED: ICD-10-PCS | Mod: CPTII,95,, | Performed by: NURSE PRACTITIONER

## 2022-12-22 PROCEDURE — 3066F PR DOCUMENTATION OF TREATMENT FOR NEPHROPATHY: ICD-10-PCS | Mod: CPTII,95,, | Performed by: NURSE PRACTITIONER

## 2022-12-22 PROCEDURE — 3046F HEMOGLOBIN A1C LEVEL >9.0%: CPT | Mod: CPTII,95,, | Performed by: NURSE PRACTITIONER

## 2022-12-22 PROCEDURE — 1160F RVW MEDS BY RX/DR IN RCRD: CPT | Mod: CPTII,95,, | Performed by: NURSE PRACTITIONER

## 2022-12-22 NOTE — Clinical Note
Please submit notes through [employer] for new order for Tandem supplies and Stu supplies- she is almost out.  Also, can someone call her to input her new pump info? They are unable to upload from home I think because her old info is still there. Please schedule a 3 month follow up visit with me.

## 2022-12-22 NOTE — PROGRESS NOTES
The patient location is: [place], [place]  The chief complaint leading to consultation is: diabetes management follow up     Visit type: audiovisual    Face to Face time with patient: 10 minutes  16 minutes of total time spent on the encounter, which includes face to face time and non-face to face time preparing to see the patient (eg, review of tests), Obtaining and/or reviewing separately obtained history, Documenting clinical information in the electronic or other health record, Independently interpreting results (not separately reported) and communicating results to the patient/family/caregiver, or Care coordination (not separately reported).     Each patient to whom he or she provides medical services by telemedicine is:  (1) informed of the relationship between the physician and patient and the respective role of any other health care provider with respect to management of the patient; and (2) notified that he or she may decline to receive medical services by telemedicine and may withdraw from such care at any time.    Notes:     Subjective:         Patient ID: Victoria Plaza is a 62 y.o. female.  Patient's current PCP is [facility]     Chief Complaint: Diabetes Mellitus    HPI  Victoria Plaza is a 62 y.o. Black or  female presenting for a follow up for diabetes. Patient has been diagnosed with type 2 diabetes since age 34 .    Comprehensive diabetes education completed through [facility] 2018.    CURRENT DM MEDICATIONS:   Novolog per Tandem TSlim x2 insulin pump- since 09/2018 (Supplies-[place])  Basal  12a: 2.3   6a: 2.3  12p- 1.8  2p: 2.3  ISF   12a: 28  6a: 22  12p: 22  2p: 25  Carb ratio  12a: 3.6  6a: 3.6  12p: 3.4   2p: 3.4   Target 150   IOB 4 hours    Past failed treatment include: Humulin R,Lantus,Basaglar - Failure to control DM    Blood glucose testing  Continuous per Stu/True Metrix [place]  Preferred lab: [facility]     Any episodes of hypoglycemia? Denies    Complications  related to diabetes: nephropathy, retinopathy, autonomic neuropathy and peripheral neuropathy    Her blood sugar in the clinic today was:   Lab Results   Component Value Date    POCGLU 319 (A) 12/09/2021     Victoria Plaza presents today for follow up visit to discuss diabetes management. No Stu download for this visit and also no pump download ready for this visit. Reports tried to upload pump from home- needs to have the new pump information entered. States Bgs have overall been a little higher than previous - thinks it's more related to her eating habits rather than settings needing to be adjusted.     Oct 4,2022 A1c with PCP = 8.5%.Will request labs.     Vitals today, 12/22/2022:  BP: 144/70,85  blood sugar: 239 mg/dL  Weight: 204 lbs    She historically has declined Dexcom that communicates with her pump.    CKD II- Followed by renal routinely, Dr. Townsend.     GI -Gastroparesis- Dr. Jb Hyde. On Reglan. Chronic intermittent nausea and vomiting.  Has not seen recently.    Vitamin D deficiency- Taking 50,000 international units twice monthly. Vitamin D level stable.    Hyperlipidemia-followed by cardiology, Dr. Sanches. She takes Lipitor 10 mg daily.     Hypothyroidism- Taking Synthroid 88 mcg daily.    Current diet: Carb counting  Activity Level: No structured exercise- limited due to chronic angina and COPD    Lab Results   Component Value Date    HGBA1C 10.1 (H) 05/25/2022    HGBA1C 8.9 (H) 03/09/2022    HGBA1C 9.0 (H) 01/20/2022     STANDARDS OF CARE  Diabetes Management Status    Statin: Taking  ACE/ARB: Taking    Screening or Prevention Patient's value Goal Complete/Controlled?   HgA1C Testing and Control   Lab Results   Component Value Date    HGBA1C 10.1 (H) 05/25/2022      Annually/Less than 8% No     Lipid profile : 05/25/2022 Annually Yes     LDL control Lab Results   Component Value Date    LDLCALC 146 (H) 05/25/2022    Annually/Less than 100 mg/dl  No     Nephropathy screening Lab Results    Component Value Date    LABMICR 71 (H) 03/09/2022     Lab Results   Component Value Date    PROTEINUA Trace (A) 01/01/2018     No results found for: UTPCR   Annually Yes     Blood pressure BP Readings from Last 1 Encounters:   12/09/21 (!) 196/93    Less than 140/90 No     Dilated retinal exam : 07/27/2022 Annually Yes     Foot exam   : 12/09/2021 Annually No          Labs reviewed and are noted below. Completed labs with PCP 10/2022- will request.    Lab Results   Component Value Date    WBC 6.2 01/04/2018    HGB 12.6 01/04/2018    HCT 37.0 01/04/2018     01/04/2018    CHOL 218 (H) 05/25/2022    TRIG 103 05/25/2022    HDL 54 05/25/2022    LDLCALC 146 (H) 05/25/2022    ALT 24 05/25/2022    AST 11 05/25/2022     05/25/2022    K 4.3 05/25/2022     05/25/2022    CREATININE 1.03 (H) 05/25/2022    EGFRNONAA 69 01/20/2022    BUN 20 05/25/2022    CO2 20 05/25/2022    TSH 4.610 (H) 05/25/2022     (H) 05/25/2022    MICROALBUR 91.6 03/09/2022     Lab Results   Component Value Date    TSH 4.610 (H) 05/25/2022    CALCIUM 9.4 05/25/2022    PHOS 2.9 12/09/2017     No results found for: CPEPTIDE  No results found for: GLUTAMICACID  Glucose   Date Value Ref Range Status   05/25/2022 171 (H) 65 - 99 mg/dL Final     eGFR if non    Date Value Ref Range Status   01/20/2022 69 >59 mL/min/1.73 Final       The following portions of the patient's history were reviewed and updated as appropriate: allergies, current medications, past family history, past medical history, past social history, past surgical history and problem list.    Review of patient's allergies indicates:   Allergen Reactions    Cephalexin Itching and Shortness Of Breath    Iodine and iodide containing products Hives, Itching and Shortness Of Breath    Latex, natural rubber Hives, Itching, Shortness Of Breath and Swelling     BREAKS OUT IN RASH      Monosodium glutamate Hives, Itching, Shortness Of Breath and Swelling     CAN'T  BREATHE      Propofol analogues Shortness Of Breath    Adhesive Hives and Itching     Social History     Socioeconomic History    Marital status: Single   Tobacco Use    Smoking status: Never    Smokeless tobacco: Never   Substance and Sexual Activity    Alcohol use: Not Currently    Drug use: Never    Sexual activity: Not Currently     Past Medical History:   Diagnosis Date    Diabetes mellitus, type 2        REVIEW OF SYSTEMS:  Eyes History of stable DR.   Cardiovascular: History of HTN and hyperlipidemia.  GI: History of gastroparesis. History of elevated alk phos.  Neuro: Autonomic neuropathy. Issues with intermittent slurred speech/aphasia.  PSYCH: No tobacco use.  ENDO: See HPI.        Objective:      There were no vitals filed for this visit.   RESPIRATORY: No respiratory distress  PSYCHIATRIC: Alert & oriented x3. Normal mood and affect.  FOOT: Deferred- video visit    Assessment:       1. Uncontrolled type 2 diabetes mellitus with hyperglycemia, with long-term current use of insulin    2. Insulin pump in place    3. Hypothyroidism, unspecified type    4. Hyperlipidemia with target LDL less than 70          Plan:   Victoria was seen today for diabetes mellitus.    Diagnoses and all orders for this visit:    Uncontrolled type 2 diabetes mellitus with hyperglycemia, with long-term current use of insulin    Chronic-    Pump Back Up Plan Only:    In the event of pump failure, remove your pump and start a long-acting insulin (such as Lantus, Basaglar, etc-whichever is preferred on your plan) at 60 units once daily. This will cover the loss of basal needs through your pump. Do not re-start your pump until the time your long-acting insulin would have been due.    Using a syringe, withdraw from Novolog to cover your meals and any high blood sugar corrections:     To determine how much to cover a meal or snack, total up your carbs and divide by 4 = how much to cover a meal or snack.    For example, you are having a 50  "gram meal.  50 / 4 = 12.5. You would take 13 units to cover this meal.    To determine how much to correct a high blood sugar: Take your current blood sugar and subtract your target blood sugar, 150. Then, divide by 25 = how much to correct a high blood sugar.    For example, your starting blood sugar is 275.  275-150 = 125.  125/25 = 5 units. You would need 5 units to correct a high blood sugar of 275.    If you are eating and your blood sugar is high, add the totals together and take before the meal.    Insulin pump in place    Patient will work on uploading pump from home to confirm settings. No changes yet.    Hypothyroidism, unspecified type    Chronic-Continue Levothyroxine 88 mcg daily. Request labs to make sure TSH is now normal.    Hyperlipidemia with target LDL less than 70    Chronic-Continue Lipitor.    - Follow up: 3 months    Portions of this note may have been created with voice recognition software. Occasional "wrong-word" or "sound-a-like" substitutions may have occurred due to the inherent limitations of voice recognition software. Please, read the note carefully and recognize, using context, where substitutions have occurred.           Sheri Ammons,FNP-C [facility]    "

## 2022-12-22 NOTE — PATIENT INSTRUCTIONS
We will be in touch regarding labs when available.     Follow-up in 3 months.    Pump Back Up Plan Only:    In the event of pump failure, remove your pump and start a long-acting insulin (such as Lantus, Basaglar, etc-whichever is preferred on your plan) at 60 units once daily. This will cover the loss of basal needs through your pump. Do not re-start your pump until the time your long-acting insulin would have been due.    Using a syringe, withdraw from Novolog to cover your meals and any high blood sugar corrections:     To determine how much to cover a meal or snack, total up your carbs and divide by 4 = how much to cover a meal or snack.    For example, you are having a 50 gram meal.  50 / 4 = 12.5. You would take 13 units to cover this meal.    To determine how much to correct a high blood sugar: Take your current blood sugar and subtract your target blood sugar, 150. Then, divide by 25 = how much to correct a high blood sugar.    For example, your starting blood sugar is 275.  275-150 = 125.  125/25 = 5 units. You would need 5 units to correct a high blood sugar of 275.    If you are eating and your blood sugar is high, add the totals together and take before the meal.    Current pump settings:    Basal  12a: 2.3   6a: 2.3  12p- 1.8  2p: 2.3  ISF   12a: 28  6a: 22  12p: 22  2p: 25  Carb ratio  12a: 3.6  6a: 3.6  12p: 3.4   2p: 3.4   Target 150   IOB 4 hours

## 2022-12-22 NOTE — TELEPHONE ENCOUNTER
Faxed ROR to Dr. Peterson's office.    ----- Message from Chapis Guzman NP sent at 12/22/2022  2:49 PM CST -----  Please request labs from her PCP, Dr. Kuldeep Peterson for our mutual patient. She reports she completed labs in October.

## 2023-02-23 ENCOUNTER — PATIENT MESSAGE (OUTPATIENT)
Dept: DIABETES | Facility: CLINIC | Age: 63
End: 2023-02-23
Payer: MEDICAID

## 2023-03-21 ENCOUNTER — TELEPHONE (OUTPATIENT)
Dept: DIABETES | Facility: CLINIC | Age: 63
End: 2023-03-21
Payer: MEDICAID

## 2023-03-21 NOTE — TELEPHONE ENCOUNTER
Called patient to discuss scheduling a nurse visit to get tandem readings before her appointment Unable to leave a message

## 2023-03-21 NOTE — TELEPHONE ENCOUNTER
----- Message from Gabrielle Alvarado RD, CDE sent at 3/21/2023  9:51 AM CDT -----  Contact: flo    ----- Message -----  From: Fior Morse  Sent: 3/21/2023   8:25 AM CDT  To: , #    Patient is returning a call to schedule an nurse visit. Please call her back at [phone].          Thanks  DD

## 2023-03-21 NOTE — TELEPHONE ENCOUNTER
Spoke to pt's daughter who stated she is unable to download pump. Pt received a new pump that she has not set-up yet. But she is unable to download current pump . She will download selene and send portal message so I can pull selene report at that time. She pryor snot want to come in for a nurse visit because she said they do not live local. Also, offered to get her an education appt to set up new tandem but she deferred saying she will set up herself. Will inform provider.

## 2023-03-22 ENCOUNTER — PATIENT MESSAGE (OUTPATIENT)
Dept: DIABETES | Facility: CLINIC | Age: 63
End: 2023-03-22
Payer: MEDICAID

## 2023-03-23 ENCOUNTER — PATIENT MESSAGE (OUTPATIENT)
Dept: DIABETES | Facility: CLINIC | Age: 63
End: 2023-03-23

## 2023-03-23 ENCOUNTER — OFFICE VISIT (OUTPATIENT)
Dept: DIABETES | Facility: CLINIC | Age: 63
End: 2023-03-23
Payer: MEDICAID

## 2023-03-23 DIAGNOSIS — R74.8 ELEVATED LIVER ENZYMES: ICD-10-CM

## 2023-03-23 DIAGNOSIS — R47.81 SLURRED SPEECH: ICD-10-CM

## 2023-03-23 DIAGNOSIS — E11.65 UNCONTROLLED TYPE 2 DIABETES MELLITUS WITH HYPERGLYCEMIA, WITH LONG-TERM CURRENT USE OF INSULIN: Primary | ICD-10-CM

## 2023-03-23 DIAGNOSIS — E78.5 HYPERLIPIDEMIA WITH TARGET LDL LESS THAN 70: ICD-10-CM

## 2023-03-23 DIAGNOSIS — Z96.41 INSULIN PUMP IN PLACE: ICD-10-CM

## 2023-03-23 DIAGNOSIS — E03.9 HYPOTHYROIDISM, UNSPECIFIED TYPE: ICD-10-CM

## 2023-03-23 DIAGNOSIS — E55.9 VITAMIN D DEFICIENCY: ICD-10-CM

## 2023-03-23 DIAGNOSIS — R74.8 ELEVATED ALKALINE PHOSPHATASE LEVEL: ICD-10-CM

## 2023-03-23 DIAGNOSIS — I10 ESSENTIAL HYPERTENSION: ICD-10-CM

## 2023-03-23 DIAGNOSIS — Z79.4 UNCONTROLLED TYPE 2 DIABETES MELLITUS WITH HYPERGLYCEMIA, WITH LONG-TERM CURRENT USE OF INSULIN: Primary | ICD-10-CM

## 2023-03-23 PROCEDURE — 1160F RVW MEDS BY RX/DR IN RCRD: CPT | Mod: CPTII,95,, | Performed by: NURSE PRACTITIONER

## 2023-03-23 PROCEDURE — 1159F MED LIST DOCD IN RCRD: CPT | Mod: CPTII,95,, | Performed by: NURSE PRACTITIONER

## 2023-03-23 PROCEDURE — 99214 OFFICE O/P EST MOD 30 MIN: CPT | Mod: 95,,, | Performed by: NURSE PRACTITIONER

## 2023-03-23 PROCEDURE — 1159F PR MEDICATION LIST DOCUMENTED IN MEDICAL RECORD: ICD-10-PCS | Mod: CPTII,95,, | Performed by: NURSE PRACTITIONER

## 2023-03-23 PROCEDURE — 99214 PR OFFICE/OUTPT VISIT, EST, LEVL IV, 30-39 MIN: ICD-10-PCS | Mod: 95,,, | Performed by: NURSE PRACTITIONER

## 2023-03-23 PROCEDURE — 1160F PR REVIEW ALL MEDS BY PRESCRIBER/CLIN PHARMACIST DOCUMENTED: ICD-10-PCS | Mod: CPTII,95,, | Performed by: NURSE PRACTITIONER

## 2023-03-23 NOTE — PROGRESS NOTES
The patient location is: [place], [place]  The chief complaint leading to consultation is: diabetes management follow up     Visit type: audiovisual    Face to Face time with patient: 10 minutes  16 minutes of total time spent on the encounter, which includes face to face time and non-face to face time preparing to see the patient (eg, review of tests), Obtaining and/or reviewing separately obtained history, Documenting clinical information in the electronic or other health record, Independently interpreting results (not separately reported) and communicating results to the patient/family/caregiver, or Care coordination (not separately reported).     Each patient to whom he or she provides medical services by telemedicine is:  (1) informed of the relationship between the physician and patient and the respective role of any other health care provider with respect to management of the patient; and (2) notified that he or she may decline to receive medical services by telemedicine and may withdraw from such care at any time.    Notes:     Subjective:         Patient ID: Victoria Plaza is a 62 y.o. female.  Patient's current PCP is [facility]     Chief Complaint: Diabetes Mellitus    HPI  Victoria Plaza is a 62 y.o. Black or  female presenting for a follow up for diabetes. Patient has been diagnosed with type 2 diabetes since age 34 .    Comprehensive diabetes education completed through [facility] 2018.    CURRENT DM MEDICATIONS:   Novolog per Tandem TSlim x2 insulin pump- since 09/2018 (Supplies-[place])  Basal  12a: 2.3   6a: 2.3  12p- 1.8  2p: 2.5  ISF   12a: 28  6a: 22  12p: 22  2p: 25  Carb ratio  12a: 3.6  6a: 3.6  12p: 3.4   2p: 3.4   Target 150   IOB 4 hours    (Has been on pump back up plan the last 2 days- Lantus and Regular insulin)  Lantus - states 20 units daily ??  Regular: 4 units ?? - we discussed how to calculate for carbs     Past failed treatment include: Humulin R,LantMatilda moraar -  Failure to control DM    Blood glucose testing  Continuous per Stu (reader)/[facility] [place]  Preferred lab: [facility]     Any episodes of hypoglycemia? Denies    Complications related to diabetes: nephropathy, retinopathy, autonomic neuropathy and peripheral neuropathy    Her blood sugar in the clinic today was:   Lab Results   Component Value Date    POCGLU 319 (A) 12/09/2021     Victoria Plaza presents today for follow up visit to discuss diabetes management. No Stu download for this visit,but pump download available. Pump was having frequent occlusion alarms- she stopped wearing her pump 2 days ago and started using Lantus and Regular insulin. She is taking much less than what is prescribed. Reviewed her pump back up plan. She is hoping to be able to start her new pump soon and she will keep me updated- advised not to re-start pump until the time Lantus would have been due. Declines diabetes education for help starting new pump.  Will wait until I can review sensor data and when patient is on new pump before making any changes.     Sees PCP on April 4.  Neurology appt- new provider - April 18. Having issues with speech.   Saw renal Jan. 24 and completed labs at that time. (Blood work was drawn Jan 17- [facility] but not available for review-daughter will try to send these results).    Oct 4,2022 A1c with PCP = 8.5%.    Vitals today, 03/23/2023  BP: 142/69, 86  blood sugar: 269 mg/dL - just had breakfast   Weight: 193 lbs    She historically has declined Dexcom that communicates with her pump.    CKD II- Followed by renal routinely, Dr. Townsend. Last visit Jan 2023.    GI -Gastroparesis- Dr. Jb Hyde. On Reglan. Chronic intermittent nausea and vomiting.  Has not seen recently.    Vitamin D deficiency- Taking 50,000 international units twice monthly. Vitamin D level stable.    Hyperlipidemia-followed by cardiology, Dr. Sanches. She takes Lipitor 10 mg daily.     Hypothyroidism- Taking Synthroid 88 mcg  daily.     Current diet: Carb counting  Activity Level: No structured exercise- limited due to chronic angina and COPD    Lab Results   Component Value Date    HGBA1C 10.1 (H) 05/25/2022    HGBA1C 8.9 (H) 03/09/2022    HGBA1C 9.0 (H) 01/20/2022     STANDARDS OF CARE  Diabetes Management Status    Statin: Taking  ACE/ARB: Taking    Screening or Prevention Patient's value Goal Complete/Controlled?   HgA1C Testing and Control   Lab Results   Component Value Date    HGBA1C 10.1 (H) 05/25/2022      Annually/Less than 8% No     Lipid profile : 05/25/2022 Annually Yes     LDL control Lab Results   Component Value Date    LDLCALC 146 (H) 05/25/2022    Annually/Less than 100 mg/dl  No     Nephropathy screening Lab Results   Component Value Date    LABMICR 71 (H) 03/09/2022     Lab Results   Component Value Date    PROTEINUA Trace (A) 01/01/2018     No results found for: UTPCR   Annually No     Blood pressure BP Readings from Last 1 Encounters:   12/09/21 (!) 196/93    Less than 140/90 No     Dilated retinal exam : 07/27/2022 Annually Yes     Foot exam   : 12/09/2021 Annually No Deferred-virtual visit           Labs reviewed and are noted below. Completed labs Jan 2023- daughter will upload a copy.    Lab Results   Component Value Date    WBC 6.2 01/04/2018    HGB 12.6 01/04/2018    HCT 37.0 01/04/2018     01/04/2018    CHOL 218 (H) 05/25/2022    TRIG 103 05/25/2022    HDL 54 05/25/2022    LDLCALC 146 (H) 05/25/2022    ALT 24 05/25/2022    AST 11 05/25/2022     05/25/2022    K 4.3 05/25/2022     05/25/2022    CREATININE 1.03 (H) 05/25/2022    EGFRNONAA 69 01/20/2022    BUN 20 05/25/2022    CO2 20 05/25/2022    TSH 4.610 (H) 05/25/2022     (H) 05/25/2022    MICROALBUR 91.6 03/09/2022     Lab Results   Component Value Date    TSH 4.610 (H) 05/25/2022    CALCIUM 9.4 05/25/2022    PHOS 2.9 12/09/2017     No results found for: CPEPTIDE  No results found for: GLUTAMICACID  Glucose   Date Value Ref Range  Status   05/25/2022 171 (H) 65 - 99 mg/dL Final     eGFR if non    Date Value Ref Range Status   01/20/2022 69 >59 mL/min/1.73 Final       The following portions of the patient's history were reviewed and updated as appropriate: allergies, current medications, past family history, past medical history, past social history, past surgical history and problem list.    Review of patient's allergies indicates:   Allergen Reactions    Cephalexin Itching and Shortness Of Breath    Iodine and iodide containing products Hives, Itching and Shortness Of Breath    Latex, natural rubber Hives, Itching, Shortness Of Breath and Swelling     BREAKS OUT IN RASH      Monosodium glutamate Hives, Itching, Shortness Of Breath and Swelling     CAN'T BREATHE      Propofol analogues Shortness Of Breath    Adhesive Hives and Itching     Social History     Socioeconomic History    Marital status: Single   Tobacco Use    Smoking status: Never    Smokeless tobacco: Never   Substance and Sexual Activity    Alcohol use: Not Currently    Drug use: Never    Sexual activity: Not Currently     Past Medical History:   Diagnosis Date    Diabetes mellitus, type 2        REVIEW OF SYSTEMS:  Eyes History of stable DR.   Cardiovascular: History of HTN and hyperlipidemia.  GI: History of gastroparesis. History of elevated alk phos.  Neuro: Autonomic neuropathy. Issues with intermittent slurred speech/aphasia.  PSYCH: No tobacco use.  ENDO: See HPI.        Objective:      There were no vitals filed for this visit.   RESPIRATORY: No respiratory distress  PSYCHIATRIC: Alert & oriented x3. Normal mood and affect.  FOOT: Deferred- video visit    Assessment:       1. Uncontrolled type 2 diabetes mellitus with hyperglycemia, with long-term current use of insulin    2. Insulin pump in place    3. Hypothyroidism, unspecified type    4. Hyperlipidemia with target LDL less than 70    5. Elevated alkaline phosphatase level    6. Slurred speech    7.  Elevated liver enzymes    8. Vitamin D deficiency    9. Essential hypertension            Plan:   Victoria was seen today for diabetes mellitus.    Diagnoses and all orders for this visit:    Uncontrolled type 2 diabetes mellitus with hyperglycemia, with long-term current use of insulin    Chronic- daughter will upload recent labs.     Pump Back Up Plan Only:    In the event of pump failure, remove your pump and start a long-acting insulin (such as Lantus, Basaglar, etc-whichever is preferred on your plan) at 60 units once daily. This will cover the loss of basal needs through your pump. Do not re-start your pump until the time your long-acting insulin would have been due.    Using a syringe, withdraw from Novolog to cover your meals and any high blood sugar corrections:     To determine how much to cover a meal or snack, total up your carbs and divide by 4 = how much to cover a meal or snack.    For example, you are having a 50 gram meal.  50 / 4 = 12.5. You would take 13 units to cover this meal.    To determine how much to correct a high blood sugar: Take your current blood sugar and subtract your target blood sugar, 150. Then, divide by 25 = how much to correct a high blood sugar.    For example, your starting blood sugar is 275.  275-150 = 125.  125/25 = 5 units. You would need 5 units to correct a high blood sugar of 275.    If you are eating and your blood sugar is high, add the totals together and take before the meal.    Insulin pump in place    Current settings active at time of visit:    Basal  12a: 2.3   6a: 2.3  12p- 1.8  2p: 2.5  ISF   12a: 28  6a: 22  12p: 22  2p: 25  Carb ratio  12a: 3.6  6a: 3.6  12p: 3.4   2p: 3.4   Target 150   IOB 4 hours    Hypothyroidism, unspecified type    Chronic-Continue Levothyroxine 88 mcg daily. Request labs to make sure TSH is now normal.    Hyperlipidemia with target LDL less than 70    Chronic-Continue Lipitor.    Elevated alkaline phosphatase level  Elevated liver  "enzymes    Chronic-Continue to monitor. Has seen GI in the past.    Slurred speech    Chronic,intermittent- has upcoming appt to re-establish care with neurology in April.    Vitamin D deficiency    Chronic-Continue current and continue to follow the level.    Essential hypertension    Chronic-BP is near goal. She has not yet had her BP meds this AM. Continue current.     - Follow up: 3 months    Portions of this note may have been created with voice recognition software. Occasional "wrong-word" or "sound-a-like" substitutions may have occurred due to the inherent limitations of voice recognition software. Please, read the note carefully and recognize, using context, where substitutions have occurred.             Chapis Guzman,FNP-C [facility]      "

## 2023-03-23 NOTE — Clinical Note
Please schedule a 4 week follow up with me. Notes: Stu reader + Tandem pump (daughter to upload both before visit). If you can't reach Ms. Plaza, please contact her [facility] to schedule this appt.

## 2023-03-23 NOTE — PATIENT INSTRUCTIONS
Follow-up in 4 weeks.    Pump Back Up Plan Only:    In the event of pump failure, remove your pump and start a long-acting insulin (such as Lantus, Basaglar, etc-whichever is preferred on your plan) at 60 units once daily. This will cover the loss of basal needs through your pump. Do not re-start your pump until the time your long-acting insulin would have been due.    Using a syringe, withdraw from Novolog to cover your meals and any high blood sugar corrections:     To determine how much to cover a meal or snack, total up your carbs and divide by 4 = how much to cover a meal or snack.    For example, you are having a 50 gram meal.  50 / 4 = 12.5. You would take 13 units to cover this meal.    To determine how much to correct a high blood sugar: Take your current blood sugar and subtract your target blood sugar, 150. Then, divide by 25 = how much to correct a high blood sugar.    For example, your starting blood sugar is 275.  275-150 = 125.  125/25 = 5 units. You would need 5 units to correct a high blood sugar of 275.    If you are eating and your blood sugar is high, add the totals together and take before the meal.    Current pump settings:    Basal  12a: 2.3   6a: 2.3  12p- 1.8  2p: 2.5  ISF   12a: 28  6a: 22  12p: 22  2p: 25  Carb ratio  12a: 3.6  6a: 3.6  12p: 3.4   2p: 3.4   Target 150   IOB 4 hours

## 2023-04-25 ENCOUNTER — PATIENT MESSAGE (OUTPATIENT)
Dept: DIABETES | Facility: CLINIC | Age: 63
End: 2023-04-25
Payer: MEDICAID

## 2023-05-16 ENCOUNTER — PATIENT MESSAGE (OUTPATIENT)
Dept: DIABETES | Facility: CLINIC | Age: 63
End: 2023-05-16
Payer: MEDICAID

## 2023-05-16 ENCOUNTER — TELEPHONE (OUTPATIENT)
Dept: DIABETES | Facility: CLINIC | Age: 63
End: 2023-05-16
Payer: MEDICAID

## 2023-05-16 NOTE — TELEPHONE ENCOUNTER
Attempt to call pt regarding nurse visit for pt appointment on 05/18/23 no answer no voice mail set up sent patient portal message

## 2023-05-18 ENCOUNTER — PATIENT MESSAGE (OUTPATIENT)
Dept: DIABETES | Facility: CLINIC | Age: 63
End: 2023-05-18

## 2023-05-18 ENCOUNTER — OFFICE VISIT (OUTPATIENT)
Dept: DIABETES | Facility: CLINIC | Age: 63
End: 2023-05-18
Payer: MEDICAID

## 2023-05-18 DIAGNOSIS — E78.5 HYPERLIPIDEMIA WITH TARGET LDL LESS THAN 70: ICD-10-CM

## 2023-05-18 DIAGNOSIS — Z96.41 INSULIN PUMP IN PLACE: ICD-10-CM

## 2023-05-18 DIAGNOSIS — I10 ESSENTIAL HYPERTENSION: ICD-10-CM

## 2023-05-18 DIAGNOSIS — Z79.4 UNCONTROLLED TYPE 2 DIABETES MELLITUS WITH HYPERGLYCEMIA, WITH LONG-TERM CURRENT USE OF INSULIN: Primary | ICD-10-CM

## 2023-05-18 DIAGNOSIS — R47.81 SLURRED SPEECH: ICD-10-CM

## 2023-05-18 DIAGNOSIS — R74.8 ELEVATED LIVER ENZYMES: ICD-10-CM

## 2023-05-18 DIAGNOSIS — E03.9 HYPOTHYROIDISM, UNSPECIFIED TYPE: ICD-10-CM

## 2023-05-18 DIAGNOSIS — E11.65 UNCONTROLLED TYPE 2 DIABETES MELLITUS WITH HYPERGLYCEMIA, WITH LONG-TERM CURRENT USE OF INSULIN: Primary | ICD-10-CM

## 2023-05-18 DIAGNOSIS — R74.8 ELEVATED ALKALINE PHOSPHATASE LEVEL: ICD-10-CM

## 2023-05-18 DIAGNOSIS — E55.9 VITAMIN D DEFICIENCY: ICD-10-CM

## 2023-05-18 DIAGNOSIS — D50.9 IRON DEFICIENCY ANEMIA, UNSPECIFIED IRON DEFICIENCY ANEMIA TYPE: ICD-10-CM

## 2023-05-18 PROCEDURE — 1159F MED LIST DOCD IN RCRD: CPT | Mod: CPTII,95,, | Performed by: NURSE PRACTITIONER

## 2023-05-18 PROCEDURE — 99214 OFFICE O/P EST MOD 30 MIN: CPT | Mod: 95,,, | Performed by: NURSE PRACTITIONER

## 2023-05-18 PROCEDURE — 99214 PR OFFICE/OUTPT VISIT, EST, LEVL IV, 30-39 MIN: ICD-10-PCS | Mod: 95,,, | Performed by: NURSE PRACTITIONER

## 2023-05-18 PROCEDURE — 1160F RVW MEDS BY RX/DR IN RCRD: CPT | Mod: CPTII,95,, | Performed by: NURSE PRACTITIONER

## 2023-05-18 PROCEDURE — 1160F PR REVIEW ALL MEDS BY PRESCRIBER/CLIN PHARMACIST DOCUMENTED: ICD-10-PCS | Mod: CPTII,95,, | Performed by: NURSE PRACTITIONER

## 2023-05-18 PROCEDURE — 1159F PR MEDICATION LIST DOCUMENTED IN MEDICAL RECORD: ICD-10-PCS | Mod: CPTII,95,, | Performed by: NURSE PRACTITIONER

## 2023-05-18 RX ORDER — FERRIC CITRATE 210 MG/1
420 TABLET, COATED ORAL DAILY
COMMUNITY

## 2023-05-18 RX ORDER — INSULIN PUMP SYRINGE, 3 ML
EACH MISCELLANEOUS
Qty: 1 EACH | Refills: 0 | Status: SHIPPED | OUTPATIENT
Start: 2023-05-18 | End: 2024-05-17

## 2023-05-18 NOTE — PATIENT INSTRUCTIONS
Follow-up in 4 weeks.    Pump Back Up Plan Only:    In the event of pump failure, remove your pump and start a long-acting insulin (such as Lantus, Basaglar, etc-whichever is preferred on your plan) at 60 units once daily. This will cover the loss of basal needs through your pump. Do not re-start your pump until the time your long-acting insulin would have been due.    Using a syringe, withdraw from Novolog to cover your meals and any high blood sugar corrections:     To determine how much to cover a meal or snack, total up your carbs and divide by 4 = how much to cover a meal or snack.    For example, you are having a 50 gram meal.  50 / 4 = 12.5. You would take 13 units to cover this meal.    To determine how much to correct a high blood sugar: Take your current blood sugar and subtract your target blood sugar, 150. Then, divide by 25 = how much to correct a high blood sugar.    For example, your starting blood sugar is 275.  275-150 = 125.  125/25 = 5 units. You would need 5 units to correct a high blood sugar of 275.    If you are eating and your blood sugar is high, add the totals together and take before the meal.    Current pump settings:    Basal  12a: 2.3   2a: 2.5  6a: 2.3  12p: 1.8  ISF   12a: 28  2a: 25  6a: 22  12p: 22  Carb ratio  12a: 3.6  2a: 3.4  6a: 3.6  12p: 3.4   Target 150   IOB 4 hours  Max bolus: 20 units

## 2023-05-18 NOTE — PROGRESS NOTES
The patient location is: [place], [place]  The chief complaint leading to consultation is: diabetes management follow up     Visit type: audiovisual    Face to Face time with patient: 15 minutes  33 minutes of total time spent on the encounter, which includes face to face time and non-face to face time preparing to see the patient (eg, review of tests), Obtaining and/or reviewing separately obtained history, Documenting clinical information in the electronic or other health record, Independently interpreting results (not separately reported) and communicating results to the patient/family/caregiver, or Care coordination (not separately reported).     Each patient to whom he or she provides medical services by telemedicine is:  (1) informed of the relationship between the physician and patient and the respective role of any other health care provider with respect to management of the patient; and (2) notified that he or she may decline to receive medical services by telemedicine and may withdraw from such care at any time.    Notes:     Subjective:         Patient ID: Victoria Plaza is a 62 y.o. female.  Patient's current PCP is [facility]     Chief Complaint: Diabetes Mellitus    HPI  Victoria Plaza is a 62 y.o. Black or  female presenting for a follow up for diabetes. Patient has been diagnosed with type 2 diabetes since age 34 .    Comprehensive diabetes education completed through [facility] 2018.    CURRENT DM MEDICATIONS:   Novolog per Tandem TSlim x2 insulin pump- since 09/2018 (Supplies-[place])  Basal  12a: 2.3   2a: 2.5  6a: 2.3  12p: 1.8  ISF   12a: 28  2a: 25  6a: 22  12p: 22  Carb ratio  12a: 3.6  2a: 3.4  6a: 3.6  12p: 3.4   Target 150   IOB 5 hours    Past failed treatment include: Humulin R,Lantus,Basaglar - Failure to control DM    Blood glucose testing  Continuous per Stu (reader)/True Metrix [place]  Preferred lab: [facility]     Any episodes of hypoglycemia?  Denies    Complications related to diabetes: nephropathy, retinopathy, autonomic neuropathy and peripheral neuropathy    Her blood sugar in the clinic today was:   Lab Results   Component Value Date    POCGLU 319 (A) 12/09/2021     Victoria Plaza presents today for follow up visit to discuss diabetes management. Between visits, completed an MRI with neurology due to intermittent aphasia- has not received results yet. Due for labs. She reports Bgs have been overall stable. Per Stu download, for the last 14 days: Average glucose remains high at 223 mg/dL. She is only within target range 36% of the time. She is high 26% and 38% of readings are > 250. Estimated GMI of 8.6%. Her max bolus setting is currently set at 10 units and she reports she is not able to override this to give more insulin for the carbs she is eating, leading to hyperglycemia. Bg pattern with glucose spikes following meals. Based on review, change max bolus setting from 10 units to 20 units and change IOB to 5 hours.       Oct 4,2022 A1c with PCP = 8.5%. A1c ordered today.    Vitals today, 05/18/2023  BP: 155/74, 77  blood sugar: 175 mg/dL   Weight: 198 lbs    She historically has declined Dexcom that communicates with her pump.    CKD II- Followed by renal routinely, Dr. Townsend. Next visit Aug 2023.    GI -Gastroparesis- Dr. Jb Hyde. On Reglan. Chronic intermittent nausea and vomiting.      Vitamin D deficiency- Taking 50,000 international units twice monthly.     Hyperlipidemia-followed by cardiology, Dr. Sanches.     Hypothyroidism- Taking Synthroid 88 mcg daily.     Current diet: Carb counting  Activity Level: No structured exercise- limited due to chronic angina and COPD    Lab Results   Component Value Date    HGBA1C 10.1 (H) 05/25/2022    HGBA1C 8.9 (H) 03/09/2022    HGBA1C 9.0 (H) 01/20/2022     STANDARDS OF CARE  Diabetes Management Status    Statin: Taking  ACE/ARB: Taking    Screening or Prevention Patient's value Goal  Complete/Controlled?   HgA1C Testing and Control   Lab Results   Component Value Date    HGBA1C 10.1 (H) 05/25/2022      Annually/Less than 8% No   Lipid profile : 05/25/2022 Annually Yes   LDL control Lab Results   Component Value Date    LDLCALC 146 (H) 05/25/2022    Annually/Less than 100 mg/dl  No   Nephropathy screening Lab Results   Component Value Date    LABMICR 71 (H) 03/09/2022     Lab Results   Component Value Date    PROTEINUA Trace (A) 01/01/2018     No results found for: UTPCR   Annually No   Blood pressure BP Readings from Last 1 Encounters:   12/09/21 (!) 196/93    Less than 140/90 No   Dilated retinal exam : 07/27/2022 Annually Yes   Foot exam   : 12/09/2021 Annually No Deferred-virtual visit        Labs reviewed and are noted below. Completed labs Jan 2023- daughter will upload a copy.    Lab Results   Component Value Date    WBC 6.2 01/04/2018    HGB 12.6 01/04/2018    HCT 37.0 01/04/2018     01/04/2018    CHOL 218 (H) 05/25/2022    TRIG 103 05/25/2022    HDL 54 05/25/2022    LDLCALC 146 (H) 05/25/2022    ALT 24 05/25/2022    AST 11 05/25/2022     05/25/2022    K 4.3 05/25/2022     05/25/2022    CREATININE 1.03 (H) 05/25/2022    EGFRNONAA 69 01/20/2022    BUN 20 05/25/2022    CO2 20 05/25/2022    TSH 4.610 (H) 05/25/2022     (H) 05/25/2022    MICROALBUR 91.6 03/09/2022     Lab Results   Component Value Date    TSH 4.610 (H) 05/25/2022    CALCIUM 9.4 05/25/2022    PHOS 2.9 12/09/2017     No results found for: CPEPTIDE  No results found for: GLUTAMICACID  Glucose   Date Value Ref Range Status   05/25/2022 171 (H) 65 - 99 mg/dL Final     eGFR if non    Date Value Ref Range Status   01/20/2022 69 >59 mL/min/1.73 Final       The following portions of the patient's history were reviewed and updated as appropriate: allergies, current medications, past family history, past medical history, past social history, past surgical history and problem list.    Review of  patient's allergies indicates:   Allergen Reactions    Cephalexin Itching and Shortness Of Breath    Iodine and iodide containing products Hives, Itching and Shortness Of Breath    Latex, natural rubber Hives, Itching, Shortness Of Breath and Swelling     BREAKS OUT IN RASH      Monosodium glutamate Hives, Itching, Shortness Of Breath and Swelling     CAN'T BREATHE      Propofol analogues Shortness Of Breath    Adhesive Hives and Itching     Social History     Socioeconomic History    Marital status: Single   Tobacco Use    Smoking status: Never    Smokeless tobacco: Never   Substance and Sexual Activity    Alcohol use: Not Currently    Drug use: Never    Sexual activity: Not Currently     Past Medical History:   Diagnosis Date    Diabetes mellitus, type 2      REVIEW OF SYSTEMS:  Eyes History of stable DR.   Cardiovascular: History of HTN and hyperlipidemia.  GI: History of gastroparesis. History of elevated alk phos.  Neuro: Autonomic neuropathy. Issues with intermittent slurred speech/aphasia.  PSYCH: No tobacco use.  ENDO: See HPI.        Objective:      There were no vitals filed for this visit.   RESPIRATORY: No respiratory distress  PSYCHIATRIC: Alert & oriented x3. Normal mood and affect.  FOOT: Deferred- video visit    Assessment:       1. Uncontrolled type 2 diabetes mellitus with hyperglycemia, with long-term current use of insulin    2. Insulin pump in place    3. Hypothyroidism, unspecified type    4. Hyperlipidemia with target LDL less than 70    5. Elevated alkaline phosphatase level    6. Slurred speech    7. Elevated liver enzymes    8. Vitamin D deficiency    9. Essential hypertension    10. Iron deficiency anemia, unspecified iron deficiency anemia type              Plan:   Victoria was seen today for diabetes mellitus.    Diagnoses and all orders for this visit:    Uncontrolled type 2 diabetes mellitus with hyperglycemia, with long-term current use of insulin  -     TSH; Future  -     T4, Free;  Future  -     Lipid Panel; Future  -     Hemoglobin A1C; Future  -     TSH  -     T4, Free  -     Lipid Panel  -     Hemoglobin A1C  -     blood-glucose meter (TRUE METRIX GLUCOSE METER) kit; Use as instructed    Chronic - Current settings: See below. Labs due now.    Pump Back Up Plan Only:    In the event of pump failure, remove your pump and start a long-acting insulin (such as Lantus, Basaglar, etc-whichever is preferred on your plan) at 60 units once daily. This will cover the loss of basal needs through your pump. Do not re-start your pump until the time your long-acting insulin would have been due.    Using a syringe, withdraw from Novolog to cover your meals and any high blood sugar corrections:     To determine how much to cover a meal or snack, total up your carbs and divide by 4 = how much to cover a meal or snack.    For example, you are having a 50 gram meal.  50 / 4 = 12.5. You would take 13 units to cover this meal.    To determine how much to correct a high blood sugar: Take your current blood sugar and subtract your target blood sugar, 150. Then, divide by 25 = how much to correct a high blood sugar.    For example, your starting blood sugar is 275.  275-150 = 125.  125/25 = 5 units. You would need 5 units to correct a high blood sugar of 275.    If you are eating and your blood sugar is high, add the totals together and take before the meal.    Continuous glucose monitoring report:    The patient's CGM was downloaded and was reviewed for the last 14 days. See HPI for interpretation & plan.     Insulin pump in place    Basal  12a: 2.3   2a: 2.5  6a: 2.3  12p: 1.8  ISF   12a: 28  2a: 25  6a: 22  12p: 22  Carb ratio  12a: 3.6  2a: 3.4  6a: 3.6  12p: 3.4   Target 150   IOB 4 hours  Max bolus: 20 units    Hypothyroidism, unspecified type  -     TSH; Future  -     T4, Free; Future  -     TSH  -     T4, Free    Chronic,stable - Continue current.    Hyperlipidemia with target LDL less than 70  -     Lipid  "Panel; Future  -     Lipid Panel    Chronic,stable- Continue current.    Elevated alkaline phosphatase level  -     HEPATIC FUNCTION PANEL; Future  -     ALKALINE PHOSPHATASE, ISOENZYMES; Future  -     HEPATIC FUNCTION PANEL  -     ALKALINE PHOSPHATASE, ISOENZYMES    Slurred speech    Chronic- having workup with neurology. Has not received results of MRI yet.    Elevated liver enzymes  -     HEPATIC FUNCTION PANEL; Future  -     ALKALINE PHOSPHATASE, ISOENZYMES; Future  -     HEPATIC FUNCTION PANEL  -     ALKALINE PHOSPHATASE, ISOENZYMES    Vitamin D deficiency  -     Vitamin D; Future  -     Vitamin D    Chronic- Continue current dose of Vit D and re-check level.    Essential hypertension  -     TSH; Future  -     T4, Free; Future  -     TSH  -     T4, Free    Chronic,stable - continue current.    Iron deficiency anemia, unspecified iron deficiency anemia type  -     GROUP & RH; Future  -     GROUP & RH    Chronic,stable - on iron supplementation. She would like to know her blood type.    - Follow up: 2 months    Portions of this note may have been created with voice recognition software. Occasional "wrong-word" or "sound-a-like" substitutions may have occurred due to the inherent limitations of voice recognition software. Please, read the note carefully and recognize, using context, where substitutions have occurred.             Sheri Ammons,FNP-C [facility]        "

## 2023-05-18 NOTE — Clinical Note
Please schedule patient a 2 month follow up visit with me. Notes: Stu + [employer] (download separate). Needs in person at the [facility].

## 2023-05-23 ENCOUNTER — TELEPHONE (OUTPATIENT)
Dept: DIABETES | Facility: CLINIC | Age: 63
End: 2023-05-23
Payer: MEDICAID

## 2023-05-23 DIAGNOSIS — R10.9 STOMACH ACHE: Primary | ICD-10-CM

## 2023-05-23 NOTE — TELEPHONE ENCOUNTER
JEMMA Calvillo Staff  She didn't read this Mychart- can we give her a call with this information? Thanks!           Previous Messages       ----- Message -----   From: Chapis Guzman NP   Sent: 5/18/2023   To: Victoria Plaza   Subject: Unread Message Notification                       I would like for you to also change your active insulin time/insulin on board time. It is currently set to 5 hours and I would like you to change it to 4 hours for me.     Thanks!     Chapis Guzman

## 2023-05-23 NOTE — TELEPHONE ENCOUNTER
----- Message from Jeanette Thompson sent at 5/23/2023  9:51 AM CDT -----  Contact: [phone]  Type:  Patient Returning Call    Who Called:pt  Who Left Message for Patient:nurse  Does the patient know what this is regarding?:missed call  Would the patient rather a call back or a response via [facility]? call  Best Call Back Number:[phone]  Additional Information:

## 2023-05-26 ENCOUNTER — PATIENT MESSAGE (OUTPATIENT)
Dept: DIABETES | Facility: CLINIC | Age: 63
End: 2023-05-26
Payer: MEDICAID

## 2023-05-27 LAB
ABO GROUP BLD: NORMAL
RH BLD: POSITIVE

## 2023-06-02 LAB
25(OH)D3+25(OH)D2 SERPL-MCNC: 40.4 NG/ML (ref 30–100)
ALBUMIN SERPL-MCNC: 3.9 G/DL (ref 3.8–4.8)
ALP BONE CFR SERPL: 35 % (ref 14–68)
ALP INTEST CFR SERPL: 12 % (ref 0–18)
ALP LIVER CFR SERPL: 53 % (ref 18–85)
ALP SERPL-CCNC: 131 IU/L (ref 44–121)
ALT SERPL-CCNC: 19 IU/L (ref 0–32)
AST SERPL-CCNC: 13 IU/L (ref 0–40)
BILIRUB DIRECT SERPL-MCNC: <0.2 MG/DL (ref 0–0.4)
BILIRUB SERPL-MCNC: 0.2 MG/DL (ref 0–1.2)
CHOLEST SERPL-MCNC: 223 MG/DL (ref 100–199)
HBA1C MFR BLD: 9.9 % (ref 4.8–5.6)
HDLC SERPL-MCNC: 52 MG/DL
LDLC SERPL CALC-MCNC: 140 MG/DL (ref 0–99)
PROT SERPL-MCNC: 7.5 G/DL (ref 6–8.5)
T4 FREE SERPL-MCNC: 0.97 NG/DL (ref 0.82–1.77)
TRIGL SERPL-MCNC: 175 MG/DL (ref 0–149)
TSH SERPL DL<=0.005 MIU/L-ACNC: 8.72 UIU/ML (ref 0.45–4.5)
VLDLC SERPL CALC-MCNC: 31 MG/DL (ref 5–40)

## 2023-06-05 ENCOUNTER — TELEPHONE (OUTPATIENT)
Dept: DIABETES | Facility: CLINIC | Age: 63
End: 2023-06-05
Payer: MEDICAID

## 2023-06-05 ENCOUNTER — HOSPITAL ENCOUNTER (OUTPATIENT)
Dept: RADIOLOGY | Facility: HOSPITAL | Age: 63
Discharge: HOME | End: 2023-06-05
Attending: OBSTETRICS & GYNECOLOGY
Payer: MEDICAID

## 2023-06-05 DIAGNOSIS — R10.9 STOMACH ACHE: ICD-10-CM

## 2023-06-05 PROCEDURE — 76856 US EXAM PELVIC COMPLETE: CPT | Mod: TC

## 2023-06-05 NOTE — PROGRESS NOTES
Please call patient with results. Blood type B+. Thyroid function study is a little abnormal, showing signs that she is not getting enough thyroid medicine. Is she taking 88 mcg daily, consistently, on an empty stomach about 30 mins before food or drink? A1c is slightly better, remains above goal at 9.9% (previous 10.1%). Cholesterol is above goal-she is followed by cardiology. Let me know if she would like a copy of these labs sent to his office and/or PCP.  The extra liver studies I ordered were normal, and the alkaline phosphatase level is lower than previous.

## 2023-06-05 NOTE — TELEPHONE ENCOUNTER
----- Message from Chapis Guzman NP sent at 6/5/2023  9:42 AM CDT -----  Please call patient with results. Blood type B+. Thyroid function study is a little abnormal, showing signs that she is not getting enough thyroid medicine. Is she taking 88 mcg daily, consistently, on an empty stomach about 30 mins before food or drink? A1c is slightly better, remains above goal at 9.9% (previous 10.1%). Cholesterol is above goal-she is followed by cardiology. Let me know if she would like a copy of these labs sent to his office and/or PCP.  The extra liver studies I ordered were normal, and the alkaline phosphatase level is lower than previous.

## 2023-06-23 DIAGNOSIS — E03.9 HYPOTHYROIDISM, UNSPECIFIED TYPE: ICD-10-CM

## 2023-06-23 RX ORDER — LEVOTHYROXINE SODIUM 88 UG/1
TABLET ORAL
Qty: 90 TABLET | Refills: 5 | Status: SHIPPED | OUTPATIENT
Start: 2023-06-23

## 2023-07-25 ENCOUNTER — OFFICE VISIT (OUTPATIENT)
Dept: DIABETES | Facility: CLINIC | Age: 63
End: 2023-07-25
Payer: MEDICAID

## 2023-07-25 VITALS
SYSTOLIC BLOOD PRESSURE: 165 MMHG | WEIGHT: 209.69 LBS | HEIGHT: 62 IN | DIASTOLIC BLOOD PRESSURE: 84 MMHG | HEART RATE: 93 BPM | BODY MASS INDEX: 38.59 KG/M2

## 2023-07-25 DIAGNOSIS — E03.9 HYPOTHYROIDISM, UNSPECIFIED TYPE: ICD-10-CM

## 2023-07-25 DIAGNOSIS — E55.9 VITAMIN D DEFICIENCY: ICD-10-CM

## 2023-07-25 DIAGNOSIS — E78.5 HYPERLIPIDEMIA WITH TARGET LDL LESS THAN 70: ICD-10-CM

## 2023-07-25 DIAGNOSIS — R47.01 APHASIA: ICD-10-CM

## 2023-07-25 DIAGNOSIS — E11.65 UNCONTROLLED TYPE 2 DIABETES MELLITUS WITH HYPERGLYCEMIA, WITH LONG-TERM CURRENT USE OF INSULIN: Primary | ICD-10-CM

## 2023-07-25 DIAGNOSIS — K31.84 DIABETIC GASTROPARESIS: ICD-10-CM

## 2023-07-25 DIAGNOSIS — Z79.4 UNCONTROLLED TYPE 2 DIABETES MELLITUS WITH HYPERGLYCEMIA, WITH LONG-TERM CURRENT USE OF INSULIN: Primary | ICD-10-CM

## 2023-07-25 DIAGNOSIS — Z96.41 INSULIN PUMP IN PLACE: ICD-10-CM

## 2023-07-25 DIAGNOSIS — I10 ESSENTIAL HYPERTENSION: ICD-10-CM

## 2023-07-25 DIAGNOSIS — R74.8 ELEVATED ALKALINE PHOSPHATASE LEVEL: ICD-10-CM

## 2023-07-25 DIAGNOSIS — E11.43 DIABETIC GASTROPARESIS: ICD-10-CM

## 2023-07-25 LAB — GLUCOSE SERPL-MCNC: 259 MG/DL (ref 70–110)

## 2023-07-25 PROCEDURE — 99999 PR PBB SHADOW E&M-EST. PATIENT-LVL V: CPT | Mod: PBBFAC,,, | Performed by: NURSE PRACTITIONER

## 2023-07-25 PROCEDURE — 3046F HEMOGLOBIN A1C LEVEL >9.0%: CPT | Mod: CPTII,,, | Performed by: NURSE PRACTITIONER

## 2023-07-25 PROCEDURE — 3079F DIAST BP 80-89 MM HG: CPT | Mod: CPTII,,, | Performed by: NURSE PRACTITIONER

## 2023-07-25 PROCEDURE — 99214 OFFICE O/P EST MOD 30 MIN: CPT | Mod: S$PBB,,, | Performed by: NURSE PRACTITIONER

## 2023-07-25 PROCEDURE — 1160F RVW MEDS BY RX/DR IN RCRD: CPT | Mod: CPTII,,, | Performed by: NURSE PRACTITIONER

## 2023-07-25 PROCEDURE — 3077F SYST BP >= 140 MM HG: CPT | Mod: CPTII,,, | Performed by: NURSE PRACTITIONER

## 2023-07-25 PROCEDURE — 1159F MED LIST DOCD IN RCRD: CPT | Mod: CPTII,,, | Performed by: NURSE PRACTITIONER

## 2023-07-25 PROCEDURE — 99999PBSHW POCT GLUCOSE, HAND-HELD DEVICE: Mod: PBBFAC,,, | Performed by: NURSE PRACTITIONER

## 2023-07-25 PROCEDURE — 99999PBSHW POCT GLUCOSE, HAND-HELD DEVICE: ICD-10-PCS | Mod: PBBFAC,,, | Performed by: NURSE PRACTITIONER

## 2023-07-25 PROCEDURE — 3046F PR MOST RECENT HEMOGLOBIN A1C LEVEL > 9.0%: ICD-10-PCS | Mod: CPTII,,, | Performed by: NURSE PRACTITIONER

## 2023-07-25 PROCEDURE — 99214 PR OFFICE/OUTPT VISIT, EST, LEVL IV, 30-39 MIN: ICD-10-PCS | Mod: S$PBB,,, | Performed by: NURSE PRACTITIONER

## 2023-07-25 PROCEDURE — 99215 OFFICE O/P EST HI 40 MIN: CPT | Mod: PBBFAC | Performed by: NURSE PRACTITIONER

## 2023-07-25 PROCEDURE — 95251 CONT GLUC MNTR ANALYSIS I&R: CPT | Mod: ,,, | Performed by: NURSE PRACTITIONER

## 2023-07-25 PROCEDURE — 1160F PR REVIEW ALL MEDS BY PRESCRIBER/CLIN PHARMACIST DOCUMENTED: ICD-10-PCS | Mod: CPTII,,, | Performed by: NURSE PRACTITIONER

## 2023-07-25 PROCEDURE — 3079F PR MOST RECENT DIASTOLIC BLOOD PRESSURE 80-89 MM HG: ICD-10-PCS | Mod: CPTII,,, | Performed by: NURSE PRACTITIONER

## 2023-07-25 PROCEDURE — 82962 GLUCOSE BLOOD TEST: CPT | Mod: PBBFAC | Performed by: NURSE PRACTITIONER

## 2023-07-25 PROCEDURE — 95251 PR GLUCOSE MONITOR, 72 HOUR, PHYS INTERP: ICD-10-PCS | Mod: ,,, | Performed by: NURSE PRACTITIONER

## 2023-07-25 PROCEDURE — 3077F PR MOST RECENT SYSTOLIC BLOOD PRESSURE >= 140 MM HG: ICD-10-PCS | Mod: CPTII,,, | Performed by: NURSE PRACTITIONER

## 2023-07-25 PROCEDURE — 3008F BODY MASS INDEX DOCD: CPT | Mod: CPTII,,, | Performed by: NURSE PRACTITIONER

## 2023-07-25 PROCEDURE — 3008F PR BODY MASS INDEX (BMI) DOCUMENTED: ICD-10-PCS | Mod: CPTII,,, | Performed by: NURSE PRACTITIONER

## 2023-07-25 PROCEDURE — 1159F PR MEDICATION LIST DOCUMENTED IN MEDICAL RECORD: ICD-10-PCS | Mod: CPTII,,, | Performed by: NURSE PRACTITIONER

## 2023-07-25 PROCEDURE — 99999 PR PBB SHADOW E&M-EST. PATIENT-LVL V: ICD-10-PCS | Mod: PBBFAC,,, | Performed by: NURSE PRACTITIONER

## 2023-07-25 NOTE — PATIENT INSTRUCTIONS
Follow-up in 8 weeks.     Pump Back Up Plan Only:    In the event of pump failure, remove your pump and start a long-acting insulin (such as Lantus, Basaglar, etc-whichever is preferred on your plan) at 60 units once daily. This will cover the loss of basal needs through your pump. Do not re-start your pump until the time your long-acting insulin would have been due.    Using a syringe, withdraw from Novolog to cover your meals and any high blood sugar corrections:     To determine how much to cover a meal or snack, total up your carbs and divide by 4 = how much to cover a meal or snack.    For example, you are having a 50 gram meal.  50 / 4 = 12.5. You would take 13 units to cover this meal.    To determine how much to correct a high blood sugar: Take your current blood sugar and subtract your target blood sugar, 150. Then, divide by 25 = how much to correct a high blood sugar.    For example, your starting blood sugar is 275.  275-150 = 125.  125/25 = 5 units. You would need 5 units to correct a high blood sugar of 275.    If you are eating and your blood sugar is high, add the totals together and take before the meal.    Current pump settings:    Basal  12a: 2.5  2a: 2.7  6a: 2.5  12p: 1.8  ISF   12a: 26  2a: 25  6a: 20  12p: 20  Carb ratio  12a: 3.4  2a: 3.4  6a: 3.4  12p: 3.2  Target 150   IOB 4 hours  Max bolus: 20 units

## 2023-07-25 NOTE — PROGRESS NOTES
Subjective:         Patient ID: Victoria Plaza is a 62 y.o. female.  Patient's current PCP is [facility].     Chief Complaint: Diabetes Mellitus    HPI  Victoria Plaza is a 62 y.o. Black or  female presenting for a follow up for diabetes. Patient has been diagnosed with type 2 diabetes since age 34 .    Comprehensive diabetes education completed through [facility] 2018.    CURRENT DM MEDICATIONS:   Novolog per Tandem TSlim x2 insulin pump- since 09/2018 (Supplies-[place])  Basal  12a: 2.3   2a: 2.5  6a: 2.3  12p: 1.8  ISF   12a: 28  2a: 25  6a: 22  12p: 22  Carb ratio  12a: 3.6  2a: 3.4  6a: 3.6  12p: 3.4   Target 150   IOB 4 hours    Past failed treatment include: Humulin R,Lantus,Basaglar - Failure to control DM    Blood glucose testing  Continuous per Stu (reader)/True Metrix [place]  Preferred lab: [facility]     Any episodes of hypoglycemia? 1% per Stu    Complications related to diabetes: nephropathy, retinopathy, autonomic neuropathy and peripheral neuropathy    Her blood sugar in the clinic today was:   Lab Results   Component Value Date    POCGLU 259 (A) 07/25/2023     Victoria Plaza presents today for follow up visit to discuss diabetes management with her daughter. Per Stu download, for the last 14 days: TIR only 33% with an avg glucose of 219 mg/dL. She remains high 33%, with 33% of readings > 250. No mild hypoglycemia and 1% significant hypoglycemia noted (suspect erroneous). CGM active only 50% of the time- reports recently fell off early and returned to finger-sticks. Overnight/evening Bgs consistently elevated. Midday readings sometimes at goal. Based on review, plan to adjust basal rates, carb ratios today. Settings adjusted at time of visit.       Intermittent aphasia - MRI was abnormal. Recently, completed MRA - gets results Aug. 3. Seeing Dr. Velasco, Neurology. ([facility]). Requesting neuro notes today-TATE completed.    She historically has declined Dexcom that  communicates with her pump.    CKD II- Followed by renal routinely, Dr. Townsend. Next visit Aug 2023. Has upcoming lab work planned- will add A1c and thyroid function studies to this set of labs.     GI -Gastroparesis- Dr. Jb Hyde. On Reglan. Chronic intermittent nausea and vomiting.      Vitamin D deficiency- Taking 50,000 international units twice monthly.     Hyperlipidemia-followed by cardiology, Dr. Sanches.     Hypothyroidism- Taking Synthroid 88 mcg daily. For last labs, she states she was not taking Levothyroxine consistently but is now checking.     Current diet: Carb counting  Activity Level: No structured exercise- limited due to chronic angina and COPD    Lab Results   Component Value Date    HGBA1C 9.9 (H) 05/26/2023    HGBA1C 10.1 (H) 05/25/2022    HGBA1C 8.9 (H) 03/09/2022     STANDARDS OF CARE  Diabetes Management Status    Statin: Taking  ACE/ARB: Taking    Screening or Prevention Patient's value Goal Complete/Controlled?   HgA1C Testing and Control   Lab Results   Component Value Date    HGBA1C 9.9 (H) 05/26/2023      Annually/Less than 8% No   Lipid profile : 05/26/2023 Annually Yes   LDL control Lab Results   Component Value Date    LDLCALC 140 (H) 05/26/2023    Annually/Less than 100 mg/dl  No   Nephropathy screening Lab Results   Component Value Date    LABMICR 71 (H) 03/09/2022     Lab Results   Component Value Date    PROTEINUA Trace (A) 01/01/2018     No results found for: UTPCR   Annually No   Blood pressure BP Readings from Last 1 Encounters:   07/25/23 (!) 165/84    Less than 140/90 No   Dilated retinal exam : 07/27/2022 Annually Yes Dr. Escamilla,retinal specialist, [facility]- will have TATE completed. MARY Yee    Foot exam   : 07/25/2023 Annually Yes      Labs reviewed and are noted below.     Lab Results   Component Value Date    WBC 6.2 01/04/2018    HGB 12.6 01/04/2018    HCT 37.0 01/04/2018     01/04/2018    CHOL 223 (H) 05/26/2023    TRIG 175 (H) 05/26/2023    HDL 52  05/26/2023    LDLCALC 140 (H) 05/26/2023    ALT 19 05/26/2023    AST 13 05/26/2023     05/25/2022    K 4.3 05/25/2022     05/25/2022    ANIONGAP 10 08/13/2020    CREATININE 1.03 (H) 05/25/2022    ESTGFRAFRICA 69 08/13/2020    EGFRNONAA 69 01/20/2022    BUN 20 05/25/2022    CO2 20 05/25/2022    TSH 8.720 (H) 05/26/2023    INR 1.1 08/12/2020     (H) 05/25/2022    MICROALBUR 91.6 03/09/2022     Lab Results   Component Value Date    TSH 8.720 (H) 05/26/2023    CALCIUM 9.4 05/25/2022    PHOS 2.9 12/09/2017     No results found for: CPEPTIDE  No results found for: GLUTAMICACID  Glucose   Date Value Ref Range Status   05/25/2022 171 (H) 65 - 99 mg/dL Final     Anion Gap   Date Value Ref Range Status   08/13/2020 10 8 - 16 mmol/L Final     eGFR    Date Value Ref Range Status   08/13/2020 69 >=60 mL/min/1.73mSq Final     eGFR if non    Date Value Ref Range Status   01/20/2022 69 >59 mL/min/1.73 Final       The following portions of the patient's history were reviewed and updated as appropriate: allergies, current medications, past family history, past medical history, past social history, past surgical history and problem list.    Review of patient's allergies indicates:   Allergen Reactions    Cephalexin Itching and Shortness Of Breath    Iodine and iodide containing products Hives, Itching and Shortness Of Breath    Latex, natural rubber Hives, Itching, Shortness Of Breath and Swelling     BREAKS OUT IN RASH      Monosodium glutamate Hives, Itching, Shortness Of Breath and Swelling     CAN'T BREATHE      Propofol analogues Shortness Of Breath    Adhesive Hives and Itching     Social History     Socioeconomic History    Marital status: Single   Tobacco Use    Smoking status: Never    Smokeless tobacco: Never   Substance and Sexual Activity    Alcohol use: Not Currently    Drug use: Never    Sexual activity: Not Currently     Past Medical History:   Diagnosis Date     Diabetes mellitus, type 2      REVIEW OF SYSTEMS:  Eyes History of stable DR.   Cardiovascular: History of HTN and hyperlipidemia.  GI: History of gastroparesis.   Neuro: Autonomic neuropathy. Issues with intermittent slurred speech/aphasia.  PSYCH: No tobacco use.  ENDO: See HPI.        Objective:      Vitals:    07/25/23 0837   BP: (!) 165/84   Pulse: 93      RESPIRATORY: No respiratory distress  PSYCHIATRIC: Alert & oriented x3. Normal mood and affect.  FOOT: Protective Sensation (w/ 10 gram monofilament):  Right: Intact  Left: Intact    Visual Inspection:  Normal -  Bilateral and Nails Intact - without Evidence of Foot Deformity- Bilateral    Pedal Pulses:   Right: Present  Left: Present    Posterior Tibialis Pulses:   Right:Present  Left: Present      Assessment:       1. Uncontrolled type 2 diabetes mellitus with hyperglycemia, with long-term current use of insulin    2. Insulin pump in place    3. Hypothyroidism, unspecified type    4. Hyperlipidemia with target LDL less than 70    5. Elevated alkaline phosphatase level    6. Aphasia    7. Vitamin D deficiency    8. Essential hypertension    9. Diabetic gastroparesis          Plan:   Victoria was seen today for diabetes mellitus.    Diagnoses and all orders for this visit:    Uncontrolled type 2 diabetes mellitus with hyperglycemia, with long-term current use of insulin  -     POCT Glucose, Hand-Held Device  -     Hemoglobin A1C; Future  -     T4, Free; Future  -     TSH; Future  -     Hemoglobin A1C  -     T4, Free  -     TSH    Chronic- New settings: See below.    Pump Back Up Plan Only:    In the event of pump failure, remove your pump and start a long-acting insulin (such as Lantus, Basaglar, etc-whichever is preferred on your plan) at 60 units once daily. This will cover the loss of basal needs through your pump. Do not re-start your pump until the time your long-acting insulin would have been due.    Using a syringe, withdraw from Novolog to cover your  "meals and any high blood sugar corrections:     To determine how much to cover a meal or snack, total up your carbs and divide by 4 = how much to cover a meal or snack.    To determine how much to correct a high blood sugar: Take your current blood sugar and subtract your target blood sugar, 150. Then, divide by 25 = how much to correct a high blood sugar.    Continuous glucose monitoring report:    The patient's CGM was downloaded and was reviewed for the last 14 days. See HPI for interpretation & plan.     Insulin pump in place    Basal  12a: 2.5  2a: 2.7  6a: 2.5  12p: 1.8  ISF   12a: 26  2a: 25  6a: 20  12p: 20  Carb ratio  12a: 3.4  2a: 3.4  6a: 3.4  12p: 3.2  Target 150   IOB 4 hours  Max bolus: 20 units    Hypothyroidism, unspecified type  -     T4, Free; Future  -     TSH; Future  -     T4, Free  -     TSH    Chronic- Now taking Synthroid 88 mcg daily, consistently. Re-check levels with next labs.    Hyperlipidemia with target LDL less than 70    Elevated alkaline phosphatase level    Chronic,Improving.    Aphasia    Intermittent - having workup with Neurology. Await results of MRA but patient states MRI was abnormal.    Vitamin D deficiency    Essential hypertension    Diabetic gastroparesis    - Follow up: 2 months    Portions of this note may have been created with voice recognition software. Occasional "wrong-word" or "sound-a-like" substitutions may have occurred due to the inherent limitations of voice recognition software. Please, read the note carefully and recognize, using context, where substitutions have occurred.             Sheri Ammons,FNP-C [facility]          "

## 2023-08-03 ENCOUNTER — TELEPHONE (OUTPATIENT)
Dept: DIABETES | Facility: CLINIC | Age: 63
End: 2023-08-03
Payer: MEDICAID

## 2023-08-04 ENCOUNTER — PATIENT MESSAGE (OUTPATIENT)
Dept: DIABETES | Facility: CLINIC | Age: 63
End: 2023-08-04
Payer: MEDICAID

## 2023-08-15 LAB
HBA1C MFR BLD: 9.6 % (ref 4.8–5.6)
T4 FREE SERPL-MCNC: 0.97 NG/DL (ref 0.82–1.77)
TSH SERPL DL<=0.005 MIU/L-ACNC: 6.94 UIU/ML (ref 0.45–4.5)

## 2023-08-16 ENCOUNTER — TELEPHONE (OUTPATIENT)
Dept: DIABETES | Facility: CLINIC | Age: 63
End: 2023-08-16
Payer: MEDICAID

## 2023-08-16 NOTE — TELEPHONE ENCOUNTER
Called pt. Notified her of the lab results. Pt states that she has been taking her thyroid supplements but has not been taking them consistently.

## 2023-08-16 NOTE — TELEPHONE ENCOUNTER
----- Message from Chapis Guzman NP sent at 8/16/2023  3:40 PM CDT -----  Thyroid stimulating hormone is better, but still a little high showing under replacement of thyroid supplement. Her free thyroid hormone also has not changed.  A1c 9.6%. Verify she is taking her thyroid supplement consistently? If so I want to adjust the dose.

## 2023-08-16 NOTE — PROGRESS NOTES
Thyroid stimulating hormone is better, but still a little high showing under replacement of thyroid supplement. Her free thyroid hormone also has not changed.  A1c 9.6%. Verify she is taking her thyroid supplement consistently? If so I want to adjust the dose.

## 2023-09-26 ENCOUNTER — PATIENT MESSAGE (OUTPATIENT)
Dept: DIABETES | Facility: CLINIC | Age: 63
End: 2023-09-26
Payer: MEDICAID

## 2023-09-26 DIAGNOSIS — E11.65 UNCONTROLLED TYPE 2 DIABETES MELLITUS WITH HYPERGLYCEMIA, WITH LONG-TERM CURRENT USE OF INSULIN: ICD-10-CM

## 2023-09-26 DIAGNOSIS — Z79.4 UNCONTROLLED TYPE 2 DIABETES MELLITUS WITH HYPERGLYCEMIA, WITH LONG-TERM CURRENT USE OF INSULIN: ICD-10-CM

## 2023-09-26 RX ORDER — INSULIN ASPART 100 [IU]/ML
INJECTION, SOLUTION INTRAVENOUS; SUBCUTANEOUS
Qty: 40 ML | Refills: 5 | Status: SHIPPED | OUTPATIENT
Start: 2023-09-26 | End: 2023-12-07 | Stop reason: SDUPTHER

## 2023-10-02 ENCOUNTER — PATIENT MESSAGE (OUTPATIENT)
Dept: DIABETES | Facility: CLINIC | Age: 63
End: 2023-10-02
Payer: MEDICAID

## 2023-10-05 ENCOUNTER — OFFICE VISIT (OUTPATIENT)
Dept: DIABETES | Facility: CLINIC | Age: 63
End: 2023-10-05
Payer: MEDICAID

## 2023-10-05 VITALS
DIASTOLIC BLOOD PRESSURE: 80 MMHG | SYSTOLIC BLOOD PRESSURE: 142 MMHG | BODY MASS INDEX: 37.93 KG/M2 | HEIGHT: 62 IN | WEIGHT: 206.13 LBS | HEART RATE: 82 BPM

## 2023-10-05 DIAGNOSIS — E03.9 HYPOTHYROIDISM, UNSPECIFIED TYPE: ICD-10-CM

## 2023-10-05 DIAGNOSIS — E11.65 UNCONTROLLED TYPE 2 DIABETES MELLITUS WITH HYPERGLYCEMIA, WITH LONG-TERM CURRENT USE OF INSULIN: Primary | ICD-10-CM

## 2023-10-05 DIAGNOSIS — R74.8 ELEVATED ALKALINE PHOSPHATASE LEVEL: ICD-10-CM

## 2023-10-05 DIAGNOSIS — E11.43 DIABETIC GASTROPARESIS: ICD-10-CM

## 2023-10-05 DIAGNOSIS — Z96.41 INSULIN PUMP IN PLACE: ICD-10-CM

## 2023-10-05 DIAGNOSIS — D50.9 IRON DEFICIENCY ANEMIA, UNSPECIFIED IRON DEFICIENCY ANEMIA TYPE: ICD-10-CM

## 2023-10-05 DIAGNOSIS — Z79.4 UNCONTROLLED TYPE 2 DIABETES MELLITUS WITH HYPERGLYCEMIA, WITH LONG-TERM CURRENT USE OF INSULIN: Primary | ICD-10-CM

## 2023-10-05 DIAGNOSIS — I10 ESSENTIAL HYPERTENSION: ICD-10-CM

## 2023-10-05 DIAGNOSIS — E55.9 VITAMIN D DEFICIENCY: ICD-10-CM

## 2023-10-05 DIAGNOSIS — E78.5 HYPERLIPIDEMIA WITH TARGET LDL LESS THAN 70: ICD-10-CM

## 2023-10-05 DIAGNOSIS — K31.84 DIABETIC GASTROPARESIS: ICD-10-CM

## 2023-10-05 PROCEDURE — 3046F HEMOGLOBIN A1C LEVEL >9.0%: CPT | Mod: CPTII,,, | Performed by: NURSE PRACTITIONER

## 2023-10-05 PROCEDURE — 3079F PR MOST RECENT DIASTOLIC BLOOD PRESSURE 80-89 MM HG: ICD-10-PCS | Mod: CPTII,,, | Performed by: NURSE PRACTITIONER

## 2023-10-05 PROCEDURE — 1160F PR REVIEW ALL MEDS BY PRESCRIBER/CLIN PHARMACIST DOCUMENTED: ICD-10-PCS | Mod: CPTII,,, | Performed by: NURSE PRACTITIONER

## 2023-10-05 PROCEDURE — 99214 PR OFFICE/OUTPT VISIT, EST, LEVL IV, 30-39 MIN: ICD-10-PCS | Mod: S$PBB,,, | Performed by: NURSE PRACTITIONER

## 2023-10-05 PROCEDURE — 1159F PR MEDICATION LIST DOCUMENTED IN MEDICAL RECORD: ICD-10-PCS | Mod: CPTII,,, | Performed by: NURSE PRACTITIONER

## 2023-10-05 PROCEDURE — 3077F SYST BP >= 140 MM HG: CPT | Mod: CPTII,,, | Performed by: NURSE PRACTITIONER

## 2023-10-05 PROCEDURE — 99999 PR PBB SHADOW E&M-EST. PATIENT-LVL V: ICD-10-PCS | Mod: PBBFAC,,, | Performed by: NURSE PRACTITIONER

## 2023-10-05 PROCEDURE — 3079F DIAST BP 80-89 MM HG: CPT | Mod: CPTII,,, | Performed by: NURSE PRACTITIONER

## 2023-10-05 PROCEDURE — 99214 OFFICE O/P EST MOD 30 MIN: CPT | Mod: S$PBB,,, | Performed by: NURSE PRACTITIONER

## 2023-10-05 PROCEDURE — 3008F BODY MASS INDEX DOCD: CPT | Mod: CPTII,,, | Performed by: NURSE PRACTITIONER

## 2023-10-05 PROCEDURE — 1160F RVW MEDS BY RX/DR IN RCRD: CPT | Mod: CPTII,,, | Performed by: NURSE PRACTITIONER

## 2023-10-05 PROCEDURE — 95251 PR GLUCOSE MONITOR, 72 HOUR, PHYS INTERP: ICD-10-PCS | Mod: ,,, | Performed by: NURSE PRACTITIONER

## 2023-10-05 PROCEDURE — 1159F MED LIST DOCD IN RCRD: CPT | Mod: CPTII,,, | Performed by: NURSE PRACTITIONER

## 2023-10-05 PROCEDURE — 3077F PR MOST RECENT SYSTOLIC BLOOD PRESSURE >= 140 MM HG: ICD-10-PCS | Mod: CPTII,,, | Performed by: NURSE PRACTITIONER

## 2023-10-05 PROCEDURE — 95251 CONT GLUC MNTR ANALYSIS I&R: CPT | Mod: ,,, | Performed by: NURSE PRACTITIONER

## 2023-10-05 PROCEDURE — 99215 OFFICE O/P EST HI 40 MIN: CPT | Mod: PBBFAC | Performed by: NURSE PRACTITIONER

## 2023-10-05 PROCEDURE — 3046F PR MOST RECENT HEMOGLOBIN A1C LEVEL > 9.0%: ICD-10-PCS | Mod: CPTII,,, | Performed by: NURSE PRACTITIONER

## 2023-10-05 PROCEDURE — 3008F PR BODY MASS INDEX (BMI) DOCUMENTED: ICD-10-PCS | Mod: CPTII,,, | Performed by: NURSE PRACTITIONER

## 2023-10-05 PROCEDURE — 99999 PR PBB SHADOW E&M-EST. PATIENT-LVL V: CPT | Mod: PBBFAC,,, | Performed by: NURSE PRACTITIONER

## 2023-10-05 RX ORDER — INSULIN ASPART INJECTION 100 [IU]/ML
INJECTION, SOLUTION SUBCUTANEOUS
Qty: 5 PEN | Refills: 5 | Status: SHIPPED | OUTPATIENT
Start: 2023-10-05

## 2023-10-05 RX ORDER — PEN NEEDLE, DIABETIC 30 GX3/16"
NEEDLE, DISPOSABLE MISCELLANEOUS
Qty: 200 EACH | Refills: 2 | Status: SHIPPED | OUTPATIENT
Start: 2023-10-05

## 2023-10-05 NOTE — PATIENT INSTRUCTIONS
Start taking levothyroxine daily,consistently.    Follow-up in 8 weeks.     Pump Back Up Plan Only:    In the event of pump failure, remove your pump and start a long-acting insulin (such as Lantus, Basaglar, etc-whichever is preferred on your plan) at 60 units once daily. This will cover the loss of basal needs through your pump. Do not re-start your pump until the time your long-acting insulin would have been due.    Using a syringe, withdraw from Novolog to cover your meals and any high blood sugar corrections:     To determine how much to cover a meal or snack, total up your carbs and divide by 4 = how much to cover a meal or snack.    For example, you are having a 50 gram meal.  50 / 4 = 12.5. You would take 13 units to cover this meal.    To determine how much to correct a high blood sugar: Take your current blood sugar and subtract your target blood sugar, 150. Then, divide by 25 = how much to correct a high blood sugar.    For example, your starting blood sugar is 275.  275-150 = 125.  125/25 = 5 units. You would need 5 units to correct a high blood sugar of 275.    If you are eating and your blood sugar is high, add the totals together and take before the meal.    Current pump settings:    Midnight 2.500 u/hr 1u:26 mg/dL 1u:3.4 g 150 mg/dL  2:00 AM 2.700 u/hr 1u:25 mg/dL 1u:3.4 g 150 mg/dL  6:00 AM 2.500 u/hr 1u:18 mg/dL 1u:3.2 g 150 mg/dL  12:00 PM 1.800 u/hr 1u:18 mg/dL 1u:3.0 g 150 mg/dL  Calculated Total Daily Basal 52.4 units  IOB 5 hours

## 2023-10-05 NOTE — PROGRESS NOTES
Subjective:         Patient ID: Victoria Plaza is a 62 y.o. female.  Patient's current PCP is No, [facility].     Chief Complaint: Diabetes Mellitus    HPI  Victoria Plaza is a 62 y.o. Black or  female presenting for a follow up for diabetes. Patient has been diagnosed with type 2 diabetes since age 34 .    Comprehensive diabetes education completed through [facility] 2018.    CURRENT DM MEDICATIONS:   Novolog per Tandem TSlim x2 insulin pump- since 09/2018 (Supplies-[place])  Midnight 2.500 u/hr 1u:26 mg/dL 1u:3.4 g 150 mg/dL  2:00 AM 2.700 u/hr 1u:25 mg/dL 1u:3.4 g 150 mg/dL  6:00 AM 2.500 u/hr 1u:20 mg/dL 1u:3.4 g 150 mg/dL  12:00 PM 1.800 u/hr 1u:20 mg/dL 1u:3.2 g 150 mg/dL  Calculated Total Daily Basal 52.4 units  IOB 5 hours     Diabetes Medications               insulin (LANTUS SOLOSTAR U-100 INSULIN) glargine 100 units/mL (3mL) SubQ pen In the event of pump failure only, remove pump and start Lantus 60 units daily. Do not re-start pump until the time Lantus would have been due.    NOVOLOG U-100 INSULIN ASPART 100 unit/mL injection INJECT 120 UNITS PER PUMP ONCE DAILY           Past failed treatment include: Humulin R,Lantus,Basaglar - Failure to control DM    Blood glucose testing  Continuous per Stu (reader)/True Metrix [place]  Preferred lab: [facility]     Any episodes of hypoglycemia? None    Complications related to diabetes: nephropathy, retinopathy, autonomic neuropathy and peripheral neuropathy    Her blood sugar in the clinic today was:   Lab Results   Component Value Date    POCGLU 259 (A) 07/25/2023     Victoria Plaza presents today for follow up visit to discuss diabetes management with her daughter. She is under a lot of stress - worried about her sister who has been in and out of the hospital. Per Stu download, for the last 14 days: Average glucose well above goal at 260 mg/dL. She is only within target range 16% of the time. She is high 33%, and 51% of readings are > 250.  "No hypoglycemia. Estimated GMI 9.5%. Glucose variability of 30.1%. Pattern of daytime hyperglycemia. Based on review, plan to adjust carb ratio and correction factors for daytime. Settings adjusted at time of visit. We discussed Stu 3 and will send referral.        Intermittent aphasia - MRI was abnormal. Recently, completed MRA - gets results Aug. 3. Seeing Dr. Velasco, Neurology. ([facility]). Requesting neuro notes today-TATE completed.    She historically has declined Dexcom that communicates with her pump.    CKD II- Followed by renal routinely, Dr. Townsend.    GI -Gastroparesis- Dr. Jb Hyde. On Reglan. Chronic intermittent nausea and vomiting.      Vitamin D deficiency- Taking 50,000 international units twice monthly.     Hyperlipidemia-followed by cardiology, Dr. Sanches.     Hypothyroidism- Taking Synthroid 88 mcg every other day per report. Stressed importance of taking daily,consistently. Will defer re-check of labs until she is doing this. She and daughter verbalized understanding.    Current diet: Carb counting  Activity Level: No structured exercise- limited due to chronic angina and COPD    Lab Results   Component Value Date    HGBA1C 9.6 (H) 08/14/2023    HGBA1C 9.9 (H) 05/26/2023    HGBA1C 10.1 (H) 05/25/2022     STANDARDS OF CARE  Diabetes Management Status    Statin: Taking  ACE/ARB: Taking    Screening or Prevention Patient's value Goal Complete/Controlled?   HgA1C Testing and Control   Lab Results   Component Value Date    HGBA1C 9.6 (H) 08/14/2023      Annually/Less than 8% No   Lipid profile : 05/26/2023 Annually Yes   LDL control Lab Results   Component Value Date    LDLCALC 140 (H) 05/26/2023    Annually/Less than 100 mg/dl  No   Nephropathy screening Lab Results   Component Value Date    LABMICR 71 (H) 03/09/2022     Lab Results   Component Value Date    PROTEINUA Trace (A) 01/01/2018     No results found for: "UTPCR"   Annually No   Blood pressure BP Readings from Last 1 Encounters: " "  10/05/23 (!) 142/80    Less than 140/90 No   Dilated retinal exam : 07/27/2022 Annually Yes Dr. Escamilla,retinal specialist, [facility]- will have TATE completed. MARY Yee    Foot exam   : 07/25/2023 Annually Yes      Labs reviewed and are noted below.     Lab Results   Component Value Date    WBC 6.2 01/04/2018    HGB 12.6 01/04/2018    HCT 37.0 01/04/2018     01/04/2018    CHOL 223 (H) 05/26/2023    TRIG 175 (H) 05/26/2023    HDL 52 05/26/2023    LDLCALC 140 (H) 05/26/2023    ALT 19 05/26/2023    AST 13 05/26/2023     05/25/2022    K 4.3 05/25/2022     05/25/2022    ANIONGAP 10 08/13/2020    CREATININE 1.03 (H) 05/25/2022    ESTGFRAFRICA 69 08/13/2020    EGFRNONAA 69 01/20/2022    BUN 20 05/25/2022    CO2 20 05/25/2022    TSH 6.940 (H) 08/14/2023    INR 1.1 08/12/2020     (H) 05/25/2022    MICROALBUR 91.6 03/09/2022     Lab Results   Component Value Date    TSH 6.940 (H) 08/14/2023    CALCIUM 9.4 05/25/2022    PHOS 2.9 12/09/2017     No results found for: "CPEPTIDE"  No results found for: "GLUTAMICACID"  Glucose   Date Value Ref Range Status   05/25/2022 171 (H) 65 - 99 mg/dL Final     Anion Gap   Date Value Ref Range Status   08/13/2020 10 8 - 16 mmol/L Final     eGFR    Date Value Ref Range Status   08/13/2020 69 >=60 mL/min/1.73mSq Final     eGFR if non    Date Value Ref Range Status   01/20/2022 69 >59 mL/min/1.73 Final       The following portions of the patient's history were reviewed and updated as appropriate: allergies, current medications, past family history, past medical history, past social history, past surgical history and problem list.    Review of patient's allergies indicates:   Allergen Reactions    Cephalexin Itching and Shortness Of Breath    Iodine and iodide containing products Hives, Itching and Shortness Of Breath    Latex, natural rubber Hives, Itching, Shortness Of Breath and Swelling     BREAKS OUT IN RASH      Monosodium glutamate " "Hives, Itching, Shortness Of Breath and Swelling     CAN'T BREATHE      Propofol analogues Shortness Of Breath    Adhesive Hives and Itching     Social History     Socioeconomic History    Marital status: Single   Tobacco Use    Smoking status: Never    Smokeless tobacco: Never   Substance and Sexual Activity    Alcohol use: Not Currently    Drug use: Never    Sexual activity: Not Currently     Past Medical History:   Diagnosis Date    Diabetes mellitus, type 2      REVIEW OF SYSTEMS:  Eyes History of stable DR.   Cardiovascular: History of HTN and hyperlipidemia.  GI: History of gastroparesis.   Neuro: Autonomic neuropathy. Issues with intermittent slurred speech/aphasia.  PSYCH: No tobacco use.  ENDO: See HPI.        Objective:      Vitals:    10/05/23 1037   BP: (!) 142/80   Pulse: 82      RESPIRATORY: No respiratory distress  PSYCHIATRIC: Alert & oriented x3. Normal mood and affect.  NEUROLOGIC: Cranial nerves II-XII grossly intact.  FOOT EXAM:UTD    Assessment:       1. Uncontrolled type 2 diabetes mellitus with hyperglycemia, with long-term current use of insulin    2. Insulin pump in place    3. Hypothyroidism, unspecified type    4. Hyperlipidemia with target LDL less than 70    5. Elevated alkaline phosphatase level    6. Vitamin D deficiency    7. Essential hypertension    8. Diabetic gastroparesis    9. Iron deficiency anemia, unspecified iron deficiency anemia type          Plan:   Victoria was seen today for diabetes mellitus.    Diagnoses and all orders for this visit:    Uncontrolled type 2 diabetes mellitus with hyperglycemia, with long-term current use of insulin  -     POCT Glucose, Hand-Held Device  -     pen needle, diabetic (BD ULTRA-FINE JYOTI PEN NEEDLE) 32 gauge x 5/32" Ndle; In the event of pump failure only, for use with Lantus daily and Fiasp 3-4 times daily.  -     insulin aspart, niacinamide, (FIASP FLEXTOUCH U-100 INSULIN) 100 unit/mL (3 mL) InPn; For use with meals and corrections for " "pump back up plan    Chronic,poor control - New settings: see below.    Pump Back Up Plan Only:    In the event of pump failure, remove your pump and start a long-acting insulin (such as Lantus, Basaglar, etc-whichever is preferred on your plan) at 60 units once daily. This will cover the loss of basal needs through your pump. Do not re-start your pump until the time your long-acting insulin would have been due.    Using a syringe, withdraw from Novolog to cover your meals and any high blood sugar corrections:     To determine how much to cover a meal or snack, total up your carbs and divide by 4 = how much to cover a meal or snack.    For example, you are having a 50 gram meal.  50 / 4 = 12.5. You would take 13 units to cover this meal.    To determine how much to correct a high blood sugar: Take your current blood sugar and subtract your target blood sugar, 150. Then, divide by 25 = how much to correct a high blood sugar.    Continuous glucose monitoring report:    The patient's CGM was downloaded and was reviewed for the last 14 days. See HPI for interpretation & plan.     Insulin pump in place    Midnight 2.500 u/hr 1u:26 mg/dL 1u:3.4 g 150 mg/dL  2:00 AM 2.700 u/hr 1u:25 mg/dL 1u:3.4 g 150 mg/dL  6:00 AM 2.500 u/hr 1u:18 mg/dL 1u:3.2 g 150 mg/dL  12:00 PM 1.800 u/hr 1u:18 mg/dL 1u:3.0 g 150 mg/dL  Calculated Total Daily Basal 52.4 units  IOB 5 hours     Hypothyroidism, unspecified type    Begin taking Levothyroxine daily, consistently. Re-check TFTs at next in person visit.     Hyperlipidemia with target LDL less than 70    Elevated alkaline phosphatase level    Vitamin D deficiency    Essential hypertension    Diabetic gastroparesis    Iron deficiency anemia, unspecified iron deficiency anemia type    - Follow up: 2 months    Portions of this note may have been created with voice recognition software. Occasional "wrong-word" or "sound-a-like" substitutions may have occurred due to the inherent limitations of " voice recognition software. Please, read the note carefully and recognize, using context, where substitutions have occurred.           JANE Garza, BC-ADM [facility]

## 2023-10-06 ENCOUNTER — TELEPHONE (OUTPATIENT)
Dept: DIABETES | Facility: CLINIC | Age: 63
End: 2023-10-06
Payer: MEDICAID

## 2023-10-08 DIAGNOSIS — E11.65 UNCONTROLLED TYPE 2 DIABETES MELLITUS WITH HYPERGLYCEMIA, WITH LONG-TERM CURRENT USE OF INSULIN: ICD-10-CM

## 2023-10-08 DIAGNOSIS — Z79.4 UNCONTROLLED TYPE 2 DIABETES MELLITUS WITH HYPERGLYCEMIA, WITH LONG-TERM CURRENT USE OF INSULIN: ICD-10-CM

## 2023-10-09 RX ORDER — ASPIRIN 325 MG
50000 TABLET, DELAYED RELEASE (ENTERIC COATED) ORAL
Qty: 12 CAPSULE | Refills: 0 | Status: SHIPPED | OUTPATIENT
Start: 2023-10-09 | End: 2024-01-08

## 2023-12-07 ENCOUNTER — OFFICE VISIT (OUTPATIENT)
Dept: DIABETES | Facility: CLINIC | Age: 63
End: 2023-12-07
Payer: MEDICAID

## 2023-12-07 VITALS
HEIGHT: 62 IN | HEART RATE: 74 BPM | BODY MASS INDEX: 38.59 KG/M2 | SYSTOLIC BLOOD PRESSURE: 172 MMHG | DIASTOLIC BLOOD PRESSURE: 75 MMHG | WEIGHT: 209.69 LBS

## 2023-12-07 DIAGNOSIS — D50.9 IRON DEFICIENCY ANEMIA, UNSPECIFIED IRON DEFICIENCY ANEMIA TYPE: ICD-10-CM

## 2023-12-07 DIAGNOSIS — E78.5 HYPERLIPIDEMIA WITH TARGET LDL LESS THAN 70: ICD-10-CM

## 2023-12-07 DIAGNOSIS — Z79.4 UNCONTROLLED TYPE 2 DIABETES MELLITUS WITH HYPERGLYCEMIA, WITH LONG-TERM CURRENT USE OF INSULIN: Primary | ICD-10-CM

## 2023-12-07 DIAGNOSIS — I10 ESSENTIAL HYPERTENSION: ICD-10-CM

## 2023-12-07 DIAGNOSIS — E55.9 VITAMIN D DEFICIENCY: ICD-10-CM

## 2023-12-07 DIAGNOSIS — R74.8 ELEVATED ALKALINE PHOSPHATASE LEVEL: ICD-10-CM

## 2023-12-07 DIAGNOSIS — E03.9 HYPOTHYROIDISM, UNSPECIFIED TYPE: ICD-10-CM

## 2023-12-07 DIAGNOSIS — Z96.41 INSULIN PUMP IN PLACE: ICD-10-CM

## 2023-12-07 DIAGNOSIS — K31.84 DIABETIC GASTROPARESIS: ICD-10-CM

## 2023-12-07 DIAGNOSIS — E11.65 UNCONTROLLED TYPE 2 DIABETES MELLITUS WITH HYPERGLYCEMIA, WITH LONG-TERM CURRENT USE OF INSULIN: Primary | ICD-10-CM

## 2023-12-07 DIAGNOSIS — E11.43 DIABETIC GASTROPARESIS: ICD-10-CM

## 2023-12-07 LAB — GLUCOSE SERPL-MCNC: 119 MG/DL (ref 70–110)

## 2023-12-07 PROCEDURE — 1159F MED LIST DOCD IN RCRD: CPT | Mod: CPTII,,, | Performed by: NURSE PRACTITIONER

## 2023-12-07 PROCEDURE — 3046F PR MOST RECENT HEMOGLOBIN A1C LEVEL > 9.0%: ICD-10-PCS | Mod: CPTII,,, | Performed by: NURSE PRACTITIONER

## 2023-12-07 PROCEDURE — 3077F PR MOST RECENT SYSTOLIC BLOOD PRESSURE >= 140 MM HG: ICD-10-PCS | Mod: CPTII,,, | Performed by: NURSE PRACTITIONER

## 2023-12-07 PROCEDURE — 99999 PR PBB SHADOW E&M-EST. PATIENT-LVL V: ICD-10-PCS | Mod: PBBFAC,,, | Performed by: NURSE PRACTITIONER

## 2023-12-07 PROCEDURE — 99215 OFFICE O/P EST HI 40 MIN: CPT | Mod: PBBFAC | Performed by: NURSE PRACTITIONER

## 2023-12-07 PROCEDURE — 3008F BODY MASS INDEX DOCD: CPT | Mod: CPTII,,, | Performed by: NURSE PRACTITIONER

## 2023-12-07 PROCEDURE — 99214 PR OFFICE/OUTPT VISIT, EST, LEVL IV, 30-39 MIN: ICD-10-PCS | Mod: S$PBB,,, | Performed by: NURSE PRACTITIONER

## 2023-12-07 PROCEDURE — 99999PBSHW POCT GLUCOSE, HAND-HELD DEVICE: ICD-10-PCS | Mod: PBBFAC,,,

## 2023-12-07 PROCEDURE — 3008F PR BODY MASS INDEX (BMI) DOCUMENTED: ICD-10-PCS | Mod: CPTII,,, | Performed by: NURSE PRACTITIONER

## 2023-12-07 PROCEDURE — 1159F PR MEDICATION LIST DOCUMENTED IN MEDICAL RECORD: ICD-10-PCS | Mod: CPTII,,, | Performed by: NURSE PRACTITIONER

## 2023-12-07 PROCEDURE — 99999 PR PBB SHADOW E&M-EST. PATIENT-LVL V: CPT | Mod: PBBFAC,,, | Performed by: NURSE PRACTITIONER

## 2023-12-07 PROCEDURE — 3078F DIAST BP <80 MM HG: CPT | Mod: CPTII,,, | Performed by: NURSE PRACTITIONER

## 2023-12-07 PROCEDURE — 1160F RVW MEDS BY RX/DR IN RCRD: CPT | Mod: CPTII,,, | Performed by: NURSE PRACTITIONER

## 2023-12-07 PROCEDURE — 3077F SYST BP >= 140 MM HG: CPT | Mod: CPTII,,, | Performed by: NURSE PRACTITIONER

## 2023-12-07 PROCEDURE — 3078F PR MOST RECENT DIASTOLIC BLOOD PRESSURE < 80 MM HG: ICD-10-PCS | Mod: CPTII,,, | Performed by: NURSE PRACTITIONER

## 2023-12-07 PROCEDURE — 99214 OFFICE O/P EST MOD 30 MIN: CPT | Mod: S$PBB,,, | Performed by: NURSE PRACTITIONER

## 2023-12-07 PROCEDURE — 3046F HEMOGLOBIN A1C LEVEL >9.0%: CPT | Mod: CPTII,,, | Performed by: NURSE PRACTITIONER

## 2023-12-07 PROCEDURE — 99999PBSHW POCT GLUCOSE, HAND-HELD DEVICE: Mod: PBBFAC,,,

## 2023-12-07 PROCEDURE — 1160F PR REVIEW ALL MEDS BY PRESCRIBER/CLIN PHARMACIST DOCUMENTED: ICD-10-PCS | Mod: CPTII,,, | Performed by: NURSE PRACTITIONER

## 2023-12-07 PROCEDURE — 82962 GLUCOSE BLOOD TEST: CPT | Mod: PBBFAC | Performed by: NURSE PRACTITIONER

## 2023-12-07 RX ORDER — INSULIN ASPART 100 [IU]/ML
INJECTION, SOLUTION INTRAVENOUS; SUBCUTANEOUS
Qty: 60 ML | Refills: 5 | Status: SHIPPED | OUTPATIENT
Start: 2023-12-07

## 2023-12-07 RX ORDER — CLONIDINE 0.2 MG/24H
1 PATCH, EXTENDED RELEASE TRANSDERMAL
COMMUNITY
Start: 2023-11-19

## 2023-12-07 NOTE — PATIENT INSTRUCTIONS
Continue Levothyroxine daily, consistently.     Follow-up in 8 weeks.     Pump Back Up Plan Only:    In the event of pump failure, remove your pump and start a long-acting insulin (such as Lantus, Basaglar, etc-whichever is preferred on your plan) at 60 units once daily. This will cover the loss of basal needs through your pump. Do not re-start your pump until the time your long-acting insulin would have been due.    Using a syringe, withdraw from Novolog to cover your meals and any high blood sugar corrections:     To determine how much to cover a meal or snack, total up your carbs and divide by 4 = how much to cover a meal or snack.    For example, you are having a 50 gram meal.  50 / 4 = 12.5. You would take 13 units to cover this meal.    To determine how much to correct a high blood sugar: Take your current blood sugar and subtract your target blood sugar, 150. Then, divide by 25 = how much to correct a high blood sugar.    For example, your starting blood sugar is 275.  275-150 = 125.  125/25 = 5 units. You would need 5 units to correct a high blood sugar of 275.    If you are eating and your blood sugar is high, add the totals together and take before the meal.    Current pump settings:    Midnight 2.500 u/hr 1u:26 mg/dL 1u:3.4 g 150 mg/dL  2:00 AM 2.700 u/hr 1u:25 mg/dL 1u:3.4 g 150 mg/dL  6:00 AM 2.500 u/hr 1u:18 mg/dL 1u:3.2 g 150 mg/dL  12:00 PM 1.800 u/hr 1u:18 mg/dL 1u:3.0 g 150 mg/dL  Calculated Total Daily Basal 52.4 units  IOB 5 hours

## 2023-12-07 NOTE — PROGRESS NOTES
Subjective:         Patient ID: Victoria Plaza is a 63 y.o. female.  Patient's current PCP is No, Primary Doctor.     Chief Complaint: Diabetes Mellitus    HPI  Victoria Plaza is a 63 y.o. Black or  female presenting for a follow up for diabetes. Patient has been diagnosed with type 2 diabetes since age 34 .    Comprehensive diabetes education completed through [facility] 2018.    CURRENT DM MEDICATIONS:   Novolog per Tandem TSlim x2 insulin pump- since 09/2018 (Supplies-[place])  Pump Profile Settings - Profile1 Active at upload  Time Basal Rate (u/hr) Correction Factor (u:mg/dL) Carb Ratio (u:grams) Target BG (mg/dL)  12:00am 2.500 1:26 1:3.4 150  2:00am 2.700 1:25 1:3.4 150  6:00am 2.500 1:18 1:3.2 150  12:00pm 2.000 1:18 1:3 150  Total Daily Basal: 54.800 u Insulin Duration: 5 hrs Carbohydrates: On    Diabetes Medications               insulin (LANTUS SOLOSTAR U-100 INSULIN) glargine 100 units/mL (3mL) SubQ pen In the event of pump failure only, remove pump and start Lantus 60 units daily. Do not re-start pump until the time Lantus would have been due.    insulin aspart, niacinamide, (FIASP FLEXTOUCH U-100 INSULIN) 100 unit/mL (3 mL) InPn For use with meals and corrections for pump back up plan    NOVOLOG U-100 INSULIN ASPART 100 unit/mL injection INJECT 120 UNITS PER PUMP ONCE DAILY           Past failed treatment include: Humulin R,Lantus,Basaglar - Failure to control DM    Blood glucose testing  Continuous per Stu (reader)/[facility] [place]  Preferred lab: [facility]     Any episodes of hypoglycemia? 0% per Stu // Declines Dexcom that communicates with her pump    Complications related to diabetes: nephropathy, retinopathy, autonomic neuropathy and peripheral neuropathy    Her blood sugar in the clinic today was:   Lab Results   Component Value Date    POCGLU 119 (A) 12/07/2023     Victoria Plaza presents today for follow up visit to discuss diabetes management with her daughter.     Per  "Stu download, for the last 14 days: Average glucose of 217 mg/dL. She is only within target range 29%, thought his is improved over last visit. She remains high 43%, and 28% of readings are > 250. No hypoglycemia. Glucose variability of 30.2%, with estimated GMI 8.5%. She is due for re-check of labs.         Intermittent aphasia - Seeing Dr. Velasco, Neurology. ([facility]).     She historically has declined Dexcom that communicates with her pump.    CKD II- Followed by renal routinely, Dr. Townsend.    GI -Gastroparesis- Dr. Jb Hyde. On Reglan. Chronic intermittent nausea and vomiting.      Vitamin D deficiency- Taking 50,000 international units twice monthly.     Hyperlipidemia-followed by cardiology, Dr. Sanches.     Hypothyroidism- Taking Synthroid 88 mcg every other day per report. Stressed importance of taking daily,consistently. Will defer re-check of labs until she is doing this. She and daughter verbalized understanding.    Current diet: Carb counting  Activity Level: No structured exercise- limited due to chronic angina and COPD    Lab Results   Component Value Date    HGBA1C 9.6 (H) 08/14/2023    HGBA1C 9.9 (H) 05/26/2023    HGBA1C 10.1 (H) 05/25/2022     STANDARDS OF CARE  Diabetes Management Status    Statin: Taking  ACE/ARB: Taking    Screening or Prevention Patient's value Goal Complete/Controlled?   HgA1C Testing and Control   Lab Results   Component Value Date    HGBA1C 9.6 (H) 08/14/2023      Annually/Less than 8% No   Lipid profile : 05/26/2023 Annually Yes   LDL control Lab Results   Component Value Date    LDLCALC 140 (H) 05/26/2023    Annually/Less than 100 mg/dl  No   Nephropathy screening Lab Results   Component Value Date    LABMICR 71 (H) 03/09/2022     Lab Results   Component Value Date    PROTEINUA Trace (A) 01/01/2018     No results found for: "UTPCR"   Annually No   Blood pressure BP Readings from Last 1 Encounters:   12/07/23 (!) 172/75    Less than 140/90 No   Dilated retinal exam " ": 07/27/2022 Annually Yes Dr. Escamilla,retinal specialist, [facility]- will have TATE completed. MARY Yee    Foot exam   : 07/25/2023 Annually Yes      Labs reviewed and are noted below.     Lab Results   Component Value Date    WBC 6.2 01/04/2018    HGB 12.6 01/04/2018    HCT 37.0 01/04/2018     01/04/2018    CHOL 223 (H) 05/26/2023    TRIG 175 (H) 05/26/2023    HDL 52 05/26/2023    LDLCALC 140 (H) 05/26/2023    ALT 19 05/26/2023    AST 13 05/26/2023     05/25/2022    K 4.3 05/25/2022     05/25/2022    ANIONGAP 10 08/13/2020    CREATININE 1.03 (H) 05/25/2022    ESTGFRAFRICA 69 08/13/2020    EGFRNONAA 69 01/20/2022    BUN 20 05/25/2022    CO2 20 05/25/2022    TSH 6.940 (H) 08/14/2023    INR 1.1 08/12/2020     (H) 05/25/2022    MICROALBUR 91.6 03/09/2022     Lab Results   Component Value Date    TSH 6.940 (H) 08/14/2023    CALCIUM 9.4 05/25/2022    PHOS 2.9 12/09/2017     No results found for: "CPEPTIDE"  No results found for: "GLUTAMICACID"  Glucose   Date Value Ref Range Status   05/25/2022 171 (H) 65 - 99 mg/dL Final     Anion Gap   Date Value Ref Range Status   08/13/2020 10 8 - 16 mmol/L Final     eGFR    Date Value Ref Range Status   08/13/2020 69 >=60 mL/min/1.73mSq Final     eGFR if non    Date Value Ref Range Status   01/20/2022 69 >59 mL/min/1.73 Final       The following portions of the patient's history were reviewed and updated as appropriate: allergies, current medications, past family history, past medical history, past social history, past surgical history and problem list.    Review of patient's allergies indicates:   Allergen Reactions    Cephalexin Itching and Shortness Of Breath    Iodine and iodide containing products Hives, Itching and Shortness Of Breath    Latex, natural rubber Hives, Itching, Shortness Of Breath and Swelling     BREAKS OUT IN RASH      Monosodium glutamate Hives, Itching, Shortness Of Breath and Swelling     CAN'T " BREATHE      Propofol analogues Shortness Of Breath    Adhesive Hives and Itching     Social History     Socioeconomic History    Marital status: Single   Tobacco Use    Smoking status: Never    Smokeless tobacco: Never   Substance and Sexual Activity    Alcohol use: Not Currently    Drug use: Never    Sexual activity: Not Currently     Past Medical History:   Diagnosis Date    Diabetes mellitus, type 2      REVIEW OF SYSTEMS:  Eyes History of stable DR.   Cardiovascular: History of HTN and HLD.  GI: History of gastroparesis.   Neuro: Autonomic neuropathy. Issues with intermittent slurred speech/aphasia.  PSYCH: No tobacco use.  ENDO: See HPI.        Objective:      Vitals:    12/07/23 1010   BP: (!) 172/75   Pulse: 74   RESPIRATORY: No respiratory distress  PSYCHIATRIC: Alert & oriented x3. Normal mood and affect.  NEUROLOGIC: Cranial nerves II-XII grossly intact.  FOOT EXAM:UTD    Assessment:       1. Uncontrolled type 2 diabetes mellitus with hyperglycemia, with long-term current use of insulin    2. Insulin pump in place    3. Hypothyroidism, unspecified type    4. Hyperlipidemia with target LDL less than 70    5. Elevated alkaline phosphatase level    6. Vitamin D deficiency    7. Essential hypertension    8. Diabetic gastroparesis    9. Iron deficiency anemia, unspecified iron deficiency anemia type          Plan:   Victoria was seen today for diabetes mellitus.    Diagnoses and all orders for this visit:    Uncontrolled type 2 diabetes mellitus with hyperglycemia, with long-term current use of insulin  -     POCT Glucose, Hand-Held Device  -     NOVOLOG U-100 INSULIN ASPART 100 unit/mL injection; INJECT 180 UNITS PER PUMP ONCE DAILY  -     Basic Metabolic Panel; Future  -     Lipid Panel; Future  -     TSH; Future  -     T4, Free; Future  -     Hemoglobin A1C; Future  -     Basic Metabolic Panel  -     Lipid Panel  -     TSH  -     T4, Free  -     Hemoglobin A1C    Chronic -     Pump Back Up Plan Only:    In  "the event of pump failure, remove your pump and start a long-acting insulin (such as Lantus, Basaglar, etc-whichever is preferred on your plan) at 60 units once daily. This will cover the loss of basal needs through your pump. Do not re-start your pump until the time your long-acting insulin would have been due.    Using a syringe, withdraw from Novolog to cover your meals and any high blood sugar corrections:     To determine how much to cover a meal or snack, total up your carbs and divide by 4 = how much to cover a meal or snack.    For example, you are having a 50 gram meal.  50 / 4 = 12.5. You would take 13 units to cover this meal.    To determine how much to correct a high blood sugar: Take your current blood sugar and subtract your target blood sugar, 150. Then, divide by 25 = how much to correct a high blood sugar.    For example, your starting blood sugar is 275.  275-150 = 125.  125/25 = 5 units. You would need 5 units to correct a high blood sugar of 275.    If you are eating and your blood sugar is high, add the totals together and take before the meal.    Insulin pump in place    No changes today.    Hypothyroidism, unspecified type  -     TSH; Future  -     T4, Free; Future  -     TSH  -     T4, Free    Continue Levothyroxine daily, consistently.  Re-check thyroid function studies.    Hyperlipidemia with target LDL less than 70  -     Lipid Panel; Future  -     Lipid Panel    Elevated alkaline phosphatase level    Vitamin D deficiency  -     Vitamin D; Future  -     Vitamin D    Essential hypertension    Diabetic gastroparesis    Iron deficiency anemia, unspecified iron deficiency anemia type    - Follow up: 2 months    Portions of this note may have been created with voice recognition software. Occasional "wrong-word" or "sound-a-like" substitutions may have occurred due to the inherent limitations of voice recognition software. Please, read the note carefully and recognize, using context, where " substitutions have occurred.           Chapis Guzman,FNP-C, BC-ADM  [facility]

## 2023-12-19 ENCOUNTER — TELEPHONE (OUTPATIENT)
Dept: DIABETES | Facility: CLINIC | Age: 63
End: 2023-12-19
Payer: MEDICAID

## 2023-12-26 ENCOUNTER — PATIENT MESSAGE (OUTPATIENT)
Dept: DIABETES | Facility: CLINIC | Age: 63
End: 2023-12-26
Payer: MEDICAID

## 2023-12-26 ENCOUNTER — TELEPHONE (OUTPATIENT)
Dept: DIABETES | Facility: CLINIC | Age: 63
End: 2023-12-26
Payer: MEDICAID

## 2023-12-26 RX ORDER — FLASH GLUCOSE SENSOR
1 KIT MISCELLANEOUS
Qty: 2 KIT | Refills: 0 | Status: SHIPPED | OUTPATIENT
Start: 2023-12-26 | End: 2024-01-26 | Stop reason: SDUPTHER

## 2023-12-26 NOTE — TELEPHONE ENCOUNTER
PA initiated for [employer]. Sent to [facility] [id number]

## 2023-12-28 ENCOUNTER — PATIENT MESSAGE (OUTPATIENT)
Dept: DIABETES | Facility: CLINIC | Age: 63
End: 2023-12-28
Payer: MEDICAID

## 2023-12-28 ENCOUNTER — TELEPHONE (OUTPATIENT)
Dept: DIABETES | Facility: CLINIC | Age: 63
End: 2023-12-28
Payer: MEDICAID

## 2023-12-28 NOTE — TELEPHONE ENCOUNTER
Sister is dying under a lot of stress, blood sugars been running high the last week or 2, been in the 400-500's, been using fiasp for the last week in addition to the Tandem, patient not really eating. Will put sensor back on today.

## 2023-12-28 NOTE — TELEPHONE ENCOUNTER
----- Message from Jeanette Thompson sent at 12/28/2023  3:17 PM CST -----  I have Criticals on line with me (Jeanette Thompson) teams me

## 2023-12-28 NOTE — TELEPHONE ENCOUNTER
Per Chapis:     Noted. I am so sorry to hear about her sister. I knew she had been sick.  Highly recommend returning to [facility] and please have her keep me updated. Push fluid intake and really need to continue to bolus through her pump (corrections) every 3-4 hours. If her blood sugar is not reducing appropriately despite consistent bolusing, needs to change her infusion set/site.     Called patient twice, was unable to reach her and she did not have voicemail set up, sent her a [employer] message with the above

## 2023-12-29 ENCOUNTER — TELEPHONE (OUTPATIENT)
Dept: DIABETES | Facility: CLINIC | Age: 63
End: 2023-12-29
Payer: MEDICAID

## 2023-12-29 LAB
25(OH)D3+25(OH)D2 SERPL-MCNC: 35.8 NG/ML (ref 30–100)
BUN SERPL-MCNC: 20 MG/DL (ref 8–27)
BUN/CREAT SERPL: 19 (ref 12–28)
CALCIUM SERPL-MCNC: 9.2 MG/DL (ref 8.7–10.3)
CHLORIDE SERPL-SCNC: 99 MMOL/L (ref 96–106)
CHOLEST SERPL-MCNC: 189 MG/DL (ref 100–199)
CO2 SERPL-SCNC: 21 MMOL/L (ref 20–29)
CREAT SERPL-MCNC: 1.04 MG/DL (ref 0.57–1)
EST. GFR  (NO RACE VARIABLE): 60 ML/MIN/1.73
GLUCOSE SERPL-MCNC: 511 MG/DL (ref 70–99)
HBA1C MFR BLD: 9.3 % (ref 4.8–5.6)
HDLC SERPL-MCNC: 52 MG/DL
LDLC SERPL CALC-MCNC: 106 MG/DL (ref 0–99)
POTASSIUM SERPL-SCNC: 4.3 MMOL/L (ref 3.5–5.2)
SODIUM SERPL-SCNC: 139 MMOL/L (ref 134–144)
T4 FREE SERPL-MCNC: 1.01 NG/DL (ref 0.82–1.77)
TRIGL SERPL-MCNC: 179 MG/DL (ref 0–149)
TSH SERPL DL<=0.005 MIU/L-ACNC: 7.9 UIU/ML (ref 0.45–4.5)
VLDLC SERPL CALC-MCNC: 31 MG/DL (ref 5–40)

## 2023-12-29 NOTE — PROGRESS NOTES
A1c slightly better but still well above goal at 9.3%. Kidneys continue to be stressed with these high blood sugars. Make sure she is drinking plenty of water. Also, is she taking her thyroid medicine every day, consistently on an empty stomach? Her level is actually worse than last checked showing signs of needing a higher dose of Levothyroxine if she has been taking daily, consistently. Let me know.

## 2023-12-29 NOTE — TELEPHONE ENCOUNTER
Informed patient of lab results, she is drinking plenty of water, she is just stressed, she has only been taking her thyroid medicine every other day, not every day but will start taking daily on an empty stomach

## 2023-12-29 NOTE — TELEPHONE ENCOUNTER
----- Message from Chapis Guzman NP sent at 12/29/2023 11:42 AM CST -----  A1c slightly better but still well above goal at 9.3%. Kidneys continue to be stressed with these high blood sugars. Make sure she is drinking plenty of water. Also, is she taking her thyroid medicine every day, consistently on an empty stomach? Her level is actually worse than last checked showing signs of needing a higher dose of Levothyroxine if she has been taking daily, consistently. Let me know.

## 2024-01-07 DIAGNOSIS — Z79.4 UNCONTROLLED TYPE 2 DIABETES MELLITUS WITH HYPERGLYCEMIA, WITH LONG-TERM CURRENT USE OF INSULIN: ICD-10-CM

## 2024-01-07 DIAGNOSIS — E11.65 UNCONTROLLED TYPE 2 DIABETES MELLITUS WITH HYPERGLYCEMIA, WITH LONG-TERM CURRENT USE OF INSULIN: ICD-10-CM

## 2024-01-08 RX ORDER — ASPIRIN 325 MG
50000 TABLET, DELAYED RELEASE (ENTERIC COATED) ORAL
Qty: 12 CAPSULE | Refills: 1 | Status: SHIPPED | OUTPATIENT
Start: 2024-01-08

## 2024-01-24 ENCOUNTER — PATIENT MESSAGE (OUTPATIENT)
Dept: DIABETES | Facility: CLINIC | Age: 64
End: 2024-01-24
Payer: MEDICAID

## 2024-01-26 DIAGNOSIS — E11.65 UNCONTROLLED TYPE 2 DIABETES MELLITUS WITH HYPERGLYCEMIA, WITH LONG-TERM CURRENT USE OF INSULIN: Primary | ICD-10-CM

## 2024-01-26 DIAGNOSIS — Z79.4 UNCONTROLLED TYPE 2 DIABETES MELLITUS WITH HYPERGLYCEMIA, WITH LONG-TERM CURRENT USE OF INSULIN: Primary | ICD-10-CM

## 2024-01-26 RX ORDER — FLASH GLUCOSE SENSOR
1 KIT MISCELLANEOUS
Qty: 2 KIT | Refills: 11 | Status: SHIPPED | OUTPATIENT
Start: 2024-01-26

## 2024-02-20 ENCOUNTER — LAB VISIT (OUTPATIENT)
Dept: LAB | Facility: HOSPITAL | Age: 64
End: 2024-02-20
Attending: NURSE PRACTITIONER
Payer: MEDICAID

## 2024-02-20 ENCOUNTER — OFFICE VISIT (OUTPATIENT)
Dept: DIABETES | Facility: CLINIC | Age: 64
End: 2024-02-20
Payer: MEDICAID

## 2024-02-20 VITALS
HEIGHT: 62 IN | WEIGHT: 207.88 LBS | HEART RATE: 79 BPM | BODY MASS INDEX: 38.25 KG/M2 | DIASTOLIC BLOOD PRESSURE: 74 MMHG | SYSTOLIC BLOOD PRESSURE: 137 MMHG

## 2024-02-20 DIAGNOSIS — D50.9 IRON DEFICIENCY ANEMIA, UNSPECIFIED IRON DEFICIENCY ANEMIA TYPE: ICD-10-CM

## 2024-02-20 DIAGNOSIS — I10 ESSENTIAL HYPERTENSION: ICD-10-CM

## 2024-02-20 DIAGNOSIS — E03.9 HYPOTHYROIDISM, UNSPECIFIED TYPE: ICD-10-CM

## 2024-02-20 DIAGNOSIS — E11.65 UNCONTROLLED TYPE 2 DIABETES MELLITUS WITH HYPERGLYCEMIA, WITH LONG-TERM CURRENT USE OF INSULIN: ICD-10-CM

## 2024-02-20 DIAGNOSIS — E11.43 DIABETIC GASTROPARESIS: ICD-10-CM

## 2024-02-20 DIAGNOSIS — K31.84 DIABETIC GASTROPARESIS: ICD-10-CM

## 2024-02-20 DIAGNOSIS — E78.5 HYPERLIPIDEMIA WITH TARGET LDL LESS THAN 70: ICD-10-CM

## 2024-02-20 DIAGNOSIS — Z79.4 UNCONTROLLED TYPE 2 DIABETES MELLITUS WITH HYPERGLYCEMIA, WITH LONG-TERM CURRENT USE OF INSULIN: ICD-10-CM

## 2024-02-20 DIAGNOSIS — Z79.4 UNCONTROLLED TYPE 2 DIABETES MELLITUS WITH HYPERGLYCEMIA, WITH LONG-TERM CURRENT USE OF INSULIN: Primary | ICD-10-CM

## 2024-02-20 DIAGNOSIS — R20.2 PARESTHESIA: ICD-10-CM

## 2024-02-20 DIAGNOSIS — Z96.41 INSULIN PUMP IN PLACE: ICD-10-CM

## 2024-02-20 DIAGNOSIS — E11.65 UNCONTROLLED TYPE 2 DIABETES MELLITUS WITH HYPERGLYCEMIA, WITH LONG-TERM CURRENT USE OF INSULIN: Primary | ICD-10-CM

## 2024-02-20 DIAGNOSIS — E55.9 VITAMIN D DEFICIENCY: ICD-10-CM

## 2024-02-20 LAB
ANION GAP SERPL CALC-SCNC: 15 MMOL/L (ref 8–16)
BUN SERPL-MCNC: 19 MG/DL (ref 8–23)
C PEPTIDE SERPL-MCNC: 0.57 NG/ML (ref 0.78–5.19)
CALCIUM SERPL-MCNC: 9.3 MG/DL (ref 8.7–10.5)
CHLORIDE SERPL-SCNC: 106 MMOL/L (ref 95–110)
CO2 SERPL-SCNC: 20 MMOL/L (ref 23–29)
CREAT SERPL-MCNC: 1.1 MG/DL (ref 0.5–1.4)
EST. GFR  (NO RACE VARIABLE): 56.1 ML/MIN/1.73 M^2
GLUCOSE SERPL-MCNC: 122 MG/DL (ref 70–110)
POTASSIUM SERPL-SCNC: 4.5 MMOL/L (ref 3.5–5.1)
SODIUM SERPL-SCNC: 141 MMOL/L (ref 136–145)
T4 FREE SERPL-MCNC: 0.86 NG/DL (ref 0.71–1.51)
TSH SERPL DL<=0.005 MIU/L-ACNC: 8.51 UIU/ML (ref 0.4–4)
VIT B12 SERPL-MCNC: 512 PG/ML (ref 210–950)

## 2024-02-20 PROCEDURE — 36415 COLL VENOUS BLD VENIPUNCTURE: CPT | Performed by: NURSE PRACTITIONER

## 2024-02-20 PROCEDURE — 3008F BODY MASS INDEX DOCD: CPT | Mod: CPTII,,, | Performed by: NURSE PRACTITIONER

## 2024-02-20 PROCEDURE — 84681 ASSAY OF C-PEPTIDE: CPT | Performed by: NURSE PRACTITIONER

## 2024-02-20 PROCEDURE — 95251 CONT GLUC MNTR ANALYSIS I&R: CPT | Mod: ,,, | Performed by: NURSE PRACTITIONER

## 2024-02-20 PROCEDURE — 84443 ASSAY THYROID STIM HORMONE: CPT | Performed by: NURSE PRACTITIONER

## 2024-02-20 PROCEDURE — 3078F DIAST BP <80 MM HG: CPT | Mod: CPTII,,, | Performed by: NURSE PRACTITIONER

## 2024-02-20 PROCEDURE — 99999 PR PBB SHADOW E&M-EST. PATIENT-LVL V: CPT | Mod: PBBFAC,,, | Performed by: NURSE PRACTITIONER

## 2024-02-20 PROCEDURE — 99215 OFFICE O/P EST HI 40 MIN: CPT | Mod: PBBFAC | Performed by: NURSE PRACTITIONER

## 2024-02-20 PROCEDURE — 1160F RVW MEDS BY RX/DR IN RCRD: CPT | Mod: CPTII,,, | Performed by: NURSE PRACTITIONER

## 2024-02-20 PROCEDURE — 1159F MED LIST DOCD IN RCRD: CPT | Mod: CPTII,,, | Performed by: NURSE PRACTITIONER

## 2024-02-20 PROCEDURE — 3075F SYST BP GE 130 - 139MM HG: CPT | Mod: CPTII,,, | Performed by: NURSE PRACTITIONER

## 2024-02-20 PROCEDURE — 82607 VITAMIN B-12: CPT | Performed by: NURSE PRACTITIONER

## 2024-02-20 PROCEDURE — 84439 ASSAY OF FREE THYROXINE: CPT | Performed by: NURSE PRACTITIONER

## 2024-02-20 PROCEDURE — 99214 OFFICE O/P EST MOD 30 MIN: CPT | Mod: S$PBB,,, | Performed by: NURSE PRACTITIONER

## 2024-02-20 PROCEDURE — 80048 BASIC METABOLIC PNL TOTAL CA: CPT | Performed by: NURSE PRACTITIONER

## 2024-02-20 NOTE — PATIENT INSTRUCTIONS
Continue Levothyroxine daily, consistently.     Follow-up in 3 months.    Pump Back Up Plan Only:    In the event of pump failure, remove your pump and start a long-acting insulin (such as Lantus, Basaglar, etc-whichever is preferred on your plan) at 60 units once daily. This will cover the loss of basal needs through your pump. Do not re-start your pump until the time your long-acting insulin would have been due.    Using a syringe, withdraw from Novolog to cover your meals and any high blood sugar corrections:     To determine how much to cover a meal or snack, total up your carbs and divide by 4 = how much to cover a meal or snack.    For example, you are having a 50 gram meal.  50 / 4 = 12.5. You would take 13 units to cover this meal.    To determine how much to correct a high blood sugar: Take your current blood sugar and subtract your target blood sugar, 150. Then, divide by 25 = how much to correct a high blood sugar.    For example, your starting blood sugar is 275.  275-150 = 125.  125/25 = 5 units. You would need 5 units to correct a high blood sugar of 275.    If you are eating and your blood sugar is high, add the totals together and take before the meal.    Current pump settings:    Pump Profile Settings - Profile1 Active at upload  Time Basal Rate (u/hr) Correction Factor (u:mg/dL) Carb Ratio (u:grams) Target BG (mg/dL)  12:00 AM 2.300 1:26 1:3.4 150  2:00 AM 2.500 1:25 1:3.4 150  6:00 AM 2.500 1:18 1:3.2 150  12:00 PM 2.000 1:18 1:3.0 150  Total Daily Basal: 54.800 u Insulin Duration: 5 hr Carbohydrates:On

## 2024-02-20 NOTE — PROGRESS NOTES
Subjective:         Patient ID: Victoria Plaza is a 63 y.o. female.  Patient's current PCP is No, Primary Doctor.     Chief Complaint: No chief complaint on file.    HPI  Victoria Plaza is a 63 y.o. Black or  female presenting for a follow up for diabetes. Patient has been diagnosed with type 2 diabetes since age 34 .    Comprehensive diabetes education completed through [facility] 2018.    CURRENT DM MEDICATIONS:   Novolog per Tandem TSlim x2 insulin pump- since 09/2018 (Supplies-[place])  Pump Profile Settings - Profile1 Active at upload  Time Basal Rate (u/hr) Correction Factor (u:mg/dL) Carb Ratio (u:grams) Target BG (mg/dL)  12:00 AM 2.500 1:26 1:3.4 150  2:00 AM 2.700 1:25 1:3.4 150  6:00 AM 2.500 1:18 1:3.2 150  12:00 PM 2.000 1:18 1:3.0 150  Total Daily Basal: 54.800 u Insulin Duration: 5 hr Carbohydrates:On    Diabetes Medications               insulin (LANTUS SOLOSTAR U-100 INSULIN) glargine 100 units/mL (3mL) SubQ pen In the event of pump failure only, remove pump and start Lantus 60 units daily. Do not re-start pump until the time Lantus would have been due.    insulin aspart, niacinamide, (FIASP FLEXTOUCH U-100 INSULIN) 100 unit/mL (3 mL) InPn For use with meals and corrections for pump back up plan    NOVOLOG U-100 INSULIN ASPART 100 unit/mL injection INJECT 180 UNITS PER PUMP ONCE DAILY           Past failed treatment include: Humulin R,Lantus,Basaglar - Failure to control DM    Blood glucose testing  Continuous per Stu (reader)/[facility] [place]  Preferred lab: [facility]     Any episodes of hypoglycemia? 0% per Stu // Declines Dexcom that communicates with her pump    Complications related to diabetes: nephropathy, retinopathy, autonomic neuropathy and peripheral neuropathy    Her blood sugar in the clinic today was:   Lab Results   Component Value Date    POCGLU 119 (A) 12/07/2023     Victoria Plaza presents today for follow up visit to discuss diabetes management with her  "daughter. We discussed switching sensor to Stu 2 Plus once this is integrated with Tandem insulin pump.    Per Stu download, for the last 14 days:  Average glucose 193 mg/dL. This is improved over previous. She has made adjustments to her diet recently. She is within target range 47% of the time. 34% of readings remain high, with 19% of readings > 250. 0% hypoglycemia, but pattern of glucose reduction overnight, at times leading to mild hypoglycemia. Based on review, basal rate reduced.       Intermittent aphasia - Seeing Dr. Velasco, Neurology. ([facility]).     CKD II- Followed by renal routinely, Dr. Townsend.    GI -Gastroparesis- Dr. Jb Hyde. On Reglan. Chronic intermittent nausea and vomiting.      Vitamin D deficiency- Taking 50,000 international units twice monthly.     Hyperlipidemia-followed by cardiology, Dr. Sanches.     Hypothyroidism- Taking Synthroid 88 mcg daily. She feels Synthroid is causing her to gain weight. Stressed importance of taking Synthroid consistently, as untreated hypothyroidism causes weight gain.     Current diet: Carb counting  Activity Level: No structured exercise- limited due to chronic angina and COPD    A1c 2/6/2024 with PCP = 9.1%  Lab Results   Component Value Date    HGBA1C 9.3 (H) 12/28/2023    HGBA1C 9.6 (H) 08/14/2023    HGBA1C 9.9 (H) 05/26/2023     STANDARDS OF CARE  Diabetes Management Status    Statin: Taking  ACE/ARB: Taking    Screening or Prevention Patient's value Goal Complete/Controlled?   HgA1C Testing and Control   Lab Results   Component Value Date    HGBA1C 9.3 (H) 12/28/2023      Annually/Less than 8% No   Lipid profile : 12/28/2023 Annually Yes   LDL control Lab Results   Component Value Date    LDLCALC 106 (H) 12/28/2023    Annually/Less than 100 mg/dl  No   Nephropathy screening Lab Results   Component Value Date    LABMICR 71 (H) 03/09/2022     Lab Results   Component Value Date    PROTEINUA Trace (A) 01/01/2018     No results found for: "UTPCR"   " "Annually No   Blood pressure BP Readings from Last 1 Encounters:   02/20/24 137/74    Less than 140/90 No   Dilated retinal exam : 07/27/2022 Annually Yes Dr. Barba on Monday. ,retinal specialist   Foot exam   : 07/25/2023 Annually Yes      Labs reviewed and are noted below.     Lab Results   Component Value Date    WBC 6.2 01/04/2018    HGB 12.6 01/04/2018    HCT 37.0 01/04/2018     01/04/2018    CHOL 189 12/28/2023    TRIG 179 (H) 12/28/2023    HDL 52 12/28/2023    LDLCALC 106 (H) 12/28/2023    ALT 19 05/26/2023    AST 13 05/26/2023     12/28/2023    K 4.3 12/28/2023    CL 99 12/28/2023    ANIONGAP 10 08/13/2020    CREATININE 1.04 (H) 12/28/2023    ESTGFRAFRICA 69 08/13/2020    EGFRNONAA 69 01/20/2022    BUN 20 12/28/2023    CO2 21 12/28/2023    TSH 7.900 (H) 12/28/2023    INR 1.1 08/12/2020     (>) 12/28/2023    MICROALBUR 91.6 03/09/2022     Lab Results   Component Value Date    TSH 7.900 (H) 12/28/2023    CALCIUM 9.2 12/28/2023    PHOS 2.9 12/09/2017     No results found for: "CPEPTIDE"  No results found for: "GLUTAMICACID"  Glucose   Date Value Ref Range Status   12/28/2023 511 (>) 70 - 99 mg/dL Final     Comment:     **Verified by repeat analysis**     Anion Gap   Date Value Ref Range Status   08/13/2020 10 8 - 16 mmol/L Final     eGFR    Date Value Ref Range Status   08/13/2020 69 >=60 mL/min/1.73mSq Final     eGFR if non    Date Value Ref Range Status   01/20/2022 69 >59 mL/min/1.73 Final       The following portions of the patient's history were reviewed and updated as appropriate: allergies, current medications, past family history, past medical history, past social history, past surgical history and problem list.    Review of patient's allergies indicates:   Allergen Reactions    Cephalexin Itching and Shortness Of Breath    Iodine and iodide containing products Hives, Itching and Shortness Of Breath    Latex, natural rubber Hives, Itching, " Shortness Of Breath and Swelling     BREAKS OUT IN RASH      Monosodium glutamate Hives, Itching, Shortness Of Breath and Swelling     CAN'T BREATHE      Propofol analogues Shortness Of Breath    Adhesive Hives and Itching     Social History     Socioeconomic History    Marital status: Single   Tobacco Use    Smoking status: Never    Smokeless tobacco: Never   Substance and Sexual Activity    Alcohol use: Not Currently    Drug use: Never    Sexual activity: Not Currently     Past Medical History:   Diagnosis Date    Diabetes mellitus, type 2      REVIEW OF SYSTEMS:  Eyes History of stable DR.   Cardiovascular: History of HTN and HLD.  GI: History of gastroparesis.   Neuro: Autonomic neuropathy. Issues with intermittent slurred speech/aphasia.  PSYCH: No tobacco use.  ENDO: See HPI.        Objective:      Vitals:    02/20/24 1053   BP: 137/74   Pulse: 79   RESPIRATORY: No respiratory distress  PSYCHIATRIC: Alert & oriented x3. Normal mood and affect.  NEUROLOGIC: Cranial nerves II-XII grossly intact.  FOOT EXAM:UTD    Assessment:       1. Uncontrolled type 2 diabetes mellitus with hyperglycemia, with long-term current use of insulin    2. Insulin pump in place    3. Hypothyroidism, unspecified type    4. Hyperlipidemia with target LDL less than 70    5. Vitamin D deficiency    6. Essential hypertension    7. Diabetic gastroparesis    8. Iron deficiency anemia, unspecified iron deficiency anemia type          Plan:   Victoria was seen today for diabetes mellitus.    Diagnoses and all orders for this visit:    Uncontrolled type 2 diabetes mellitus with hyperglycemia, with long-term current use of insulin  -     POCT Glucose, Hand-Held Device  -     Basic Metabolic Panel; Future  -     C-Peptide; Future    Chronic - New settings: See below.    Pump Back Up Plan Only:    In the event of pump failure, remove your pump and start a long-acting insulin (such as Lantus, Basaglar, etc-whichever is preferred on your plan) at 60  "units once daily. This will cover the loss of basal needs through your pump. Do not re-start your pump until the time your long-acting insulin would have been due.    Using a syringe, withdraw from Novolog to cover your meals and any high blood sugar corrections:     To determine how much to cover a meal or snack, total up your carbs and divide by 4 = how much to cover a meal or snack.    To determine how much to correct a high blood sugar: Take your current blood sugar and subtract your target blood sugar, 150. Then, divide by 25 = how much to correct a high blood sugar.    Continuous glucose monitoring report:    The patient's CGM was downloaded and was reviewed for the last 14 days. See HPI for interpretation & plan.    Insulin pump in place    Pump Profile Settings - Profile1 Active at upload  Time Basal Rate (u/hr) Correction Factor (u:mg/dL) Carb Ratio (u:grams) Target BG (mg/dL)  12:00 AM 2.300 1:26 1:3.4 150  2:00 AM 2.500 1:25 1:3.4 150  6:00 AM 2.500 1:18 1:3.2 150  12:00 PM 2.000 1:18 1:3.0 150  Total Daily Basal: 54.800 u Insulin Duration: 5 hr Carbohydrates:On    Hypothyroidism, unspecified type  -     T4, Free; Future  -     TSH; Future    Re-check TFTs. Stressed importance of taking Levothyroxine daily, consistently.     Hyperlipidemia with target LDL less than 70    Vitamin D deficiency    Essential hypertension    Diabetic gastroparesis    Iron deficiency anemia, unspecified iron deficiency anemia type    Paresthesia  -     VITAMIN B12; Future    - Follow up: 3 months    Portions of this note may have been created with voice recognition software. Occasional "wrong-word" or "sound-a-like" substitutions may have occurred due to the inherent limitations of voice recognition software. Please, read the note carefully and recognize, using context, where substitutions have occurred.           MAREK GarzaC, BC-ADM  [facility]  "

## 2024-02-21 NOTE — PROGRESS NOTES
Please let her know and her daughter I have reviewed her results. Her TSH remains elevated with a free thyroid hormone lower end of normal, which is evident of uncontrolled hypothyroidism which is leading to fatigue and weight gain. Verify one more time that she has been taking her Levothyroxine daily, consistently? If so I will need to increase the dose. It needs to be on an empty stomach, 30 minutes before food or any other meds. C-peptide is low, a marker of insulin production, confirming the pancreas can not produce optimal amounts of insulin. B12 level is normal.

## 2024-02-23 ENCOUNTER — TELEPHONE (OUTPATIENT)
Dept: DIABETES | Facility: CLINIC | Age: 64
End: 2024-02-23
Payer: MEDICAID

## 2024-02-23 NOTE — TELEPHONE ENCOUNTER
----- Message from Chapis Guzman NP sent at 2/21/2024  1:58 PM CST -----  Please let her know and her daughter I have reviewed her results. Her TSH remains elevated with a free thyroid hormone lower end of normal, which is evident of uncontrolled hypothyroidism which is leading to fatigue and weight gain. Verify one more time that she has been taking her Levothyroxine daily, consistently? If so I will need to increase the dose. It needs to be on an empty stomach, 30 minutes before food or any other meds. C-peptide is low, a marker of insulin production, confirming the pancreas can not produce optimal amounts of insulin. B12 level is normal.

## 2024-02-23 NOTE — TELEPHONE ENCOUNTER
Called and spoke with patient and her daughter, patient informed of lab results, she said she does not take her levothyroxine like she should because of the weight gain, encouraged patient to consistently take her thyroid medication every morning on an empty stomach 30 minutes before she eat so that she can see a change in her energy level and weight, patient stated she will begin taking the 88mcg appropriately, informed her that Chapis will recheck her levels and if her numbers are still not where they need to be, Chapis will have to increase her dose, patient verbalized understanding

## 2024-05-06 DIAGNOSIS — Z79.4 UNCONTROLLED TYPE 2 DIABETES MELLITUS WITH HYPERGLYCEMIA, WITH LONG-TERM CURRENT USE OF INSULIN: ICD-10-CM

## 2024-05-06 DIAGNOSIS — E11.65 UNCONTROLLED TYPE 2 DIABETES MELLITUS WITH HYPERGLYCEMIA, WITH LONG-TERM CURRENT USE OF INSULIN: ICD-10-CM

## 2024-05-06 RX ORDER — INSULIN ASPART 100 [IU]/ML
INJECTION, SOLUTION INTRAVENOUS; SUBCUTANEOUS
Qty: 40 ML | Refills: 3 | Status: SHIPPED | OUTPATIENT
Start: 2024-05-06

## 2024-05-29 ENCOUNTER — OFFICE VISIT (OUTPATIENT)
Dept: DIABETES | Facility: CLINIC | Age: 64
End: 2024-05-29
Payer: MEDICAID

## 2024-05-29 VITALS
WEIGHT: 210.13 LBS | SYSTOLIC BLOOD PRESSURE: 129 MMHG | HEIGHT: 62 IN | DIASTOLIC BLOOD PRESSURE: 63 MMHG | HEART RATE: 86 BPM | BODY MASS INDEX: 38.67 KG/M2

## 2024-05-29 DIAGNOSIS — E78.5 HYPERLIPIDEMIA WITH TARGET LDL LESS THAN 70: ICD-10-CM

## 2024-05-29 DIAGNOSIS — D50.9 IRON DEFICIENCY ANEMIA, UNSPECIFIED IRON DEFICIENCY ANEMIA TYPE: ICD-10-CM

## 2024-05-29 DIAGNOSIS — Z96.41 INSULIN PUMP IN PLACE: ICD-10-CM

## 2024-05-29 DIAGNOSIS — E11.43 DIABETIC GASTROPARESIS: ICD-10-CM

## 2024-05-29 DIAGNOSIS — Z79.4 UNCONTROLLED TYPE 2 DIABETES MELLITUS WITH HYPERGLYCEMIA, WITH LONG-TERM CURRENT USE OF INSULIN: Primary | ICD-10-CM

## 2024-05-29 DIAGNOSIS — K31.84 DIABETIC GASTROPARESIS: ICD-10-CM

## 2024-05-29 DIAGNOSIS — I10 ESSENTIAL HYPERTENSION: ICD-10-CM

## 2024-05-29 DIAGNOSIS — E55.9 VITAMIN D DEFICIENCY: ICD-10-CM

## 2024-05-29 DIAGNOSIS — E11.65 UNCONTROLLED TYPE 2 DIABETES MELLITUS WITH HYPERGLYCEMIA, WITH LONG-TERM CURRENT USE OF INSULIN: Primary | ICD-10-CM

## 2024-05-29 DIAGNOSIS — E03.9 HYPOTHYROIDISM, UNSPECIFIED TYPE: ICD-10-CM

## 2024-05-29 PROCEDURE — 99214 OFFICE O/P EST MOD 30 MIN: CPT | Mod: S$PBB,,, | Performed by: NURSE PRACTITIONER

## 2024-05-29 PROCEDURE — 3008F BODY MASS INDEX DOCD: CPT | Mod: CPTII,,, | Performed by: NURSE PRACTITIONER

## 2024-05-29 PROCEDURE — 99999 PR PBB SHADOW E&M-EST. PATIENT-LVL V: CPT | Mod: PBBFAC,,, | Performed by: NURSE PRACTITIONER

## 2024-05-29 PROCEDURE — 3074F SYST BP LT 130 MM HG: CPT | Mod: CPTII,,, | Performed by: NURSE PRACTITIONER

## 2024-05-29 PROCEDURE — 1159F MED LIST DOCD IN RCRD: CPT | Mod: CPTII,,, | Performed by: NURSE PRACTITIONER

## 2024-05-29 PROCEDURE — 99215 OFFICE O/P EST HI 40 MIN: CPT | Mod: PBBFAC | Performed by: NURSE PRACTITIONER

## 2024-05-29 PROCEDURE — 3078F DIAST BP <80 MM HG: CPT | Mod: CPTII,,, | Performed by: NURSE PRACTITIONER

## 2024-05-29 NOTE — PATIENT INSTRUCTIONS
Continue Levothyroxine daily, consistently.     Follow-up in 3 months.    Pump Back Up Plan Only:    In the event of pump failure, remove your pump and start a long-acting insulin (such as Lantus, Basaglar, etc-whichever is preferred on your plan) at 60 units once daily. This will cover the loss of basal needs through your pump. Do not re-start your pump until the time your long-acting insulin would have been due.    Using a syringe, withdraw from Novolog to cover your meals and any high blood sugar corrections:     To determine how much to cover a meal or snack, total up your carbs and divide by 4 = how much to cover a meal or snack.    For example, you are having a 50 gram meal.  50 / 4 = 12.5. You would take 13 units to cover this meal.    To determine how much to correct a high blood sugar: Take your current blood sugar and subtract your target blood sugar, 150. Then, divide by 25 = how much to correct a high blood sugar.    For example, your starting blood sugar is 275.  275-150 = 125.  125/25 = 5 units. You would need 5 units to correct a high blood sugar of 275.    If you are eating and your blood sugar is high, add the totals together and take before the meal.    Current pump settings:    Pump Profile Settings - Profile 1 Active at upload  Time Basal Rate (u/hr) Correction Factor (u:mg/dL) Carb Ratio (u:grams) Target BG (mg/dL)  12:00 AM 2.300 1:28 1:3.6 150  2:00 AM 2.500 1:25 1:3.4 150  6:00 AM 2.300 1:22 1:3.6 150  12:00 PM 1.800 1:22 1:3.4 150  Total Daily Basal: 50.000 u Insulin Duration: 5 hr Carbohydrates:On

## 2024-05-29 NOTE — PROGRESS NOTES
Subjective:         Patient ID: Victoria Plaza is a 63 y.o. female.  Patient's current PCP is No, Primary Doctor.     Chief Complaint: Diabetes Mellitus    HPI  Victoria Plaza is a 63 y.o. Black or  female presenting for a follow up for diabetes. Patient has been diagnosed with type 2 diabetes since age 34 .    Comprehensive diabetes education completed through [facility] 2018.    CURRENT DM MEDICATIONS:   Novolog per Tandem TSlim x2 insulin pump- since 09/2018 (Supplies-[place] [facility])  Pump Profile Settings - Profile 1 Active at upload  Time Basal Rate (u/hr) Correction Factor (u:mg/dL) Carb Ratio (u:grams) Target BG (mg/dL)  12:00 AM 2.300 1:28 1:3.6 150  2:00 AM 2.500 1:25 1:3.4 150  6:00 AM 2.300 1:22 1:3.6 150  12:00 PM 1.800 1:22 1:3.4 150  Total Daily Basal: 50.000 u Insulin Duration: 5 hr Carbohydrates:On    Diabetes Medications               insulin (LANTUS SOLOSTAR U-100 INSULIN) glargine 100 units/mL (3mL) SubQ pen In the event of pump failure only, remove pump and start Lantus 60 units daily. Do not re-start pump until the time Lantus would have been due.    insulin aspart, niacinamide, (FIASP FLEXTOUCH U-100 INSULIN) 100 unit/mL (3 mL) InPn For use with meals and corrections for pump back up plan    NOVOLOG U-100 INSULIN ASPART 100 unit/mL injection INJECT  120 UNITS PER PUMP ONCE DAILY           Past failed treatment include: Humulin R,Lantus,Basaglar - Failure to control DM    Blood glucose testing  Continuous per Stu (reader)/[facility] [place] [facility]  Preferred lab: [facility]     Any episodes of hypoglycemia? 0% per Stu // Declines Dexcom or Stu 2 Plus at this time    Complications related to diabetes: nephropathy, retinopathy, autonomic neuropathy and peripheral neuropathy    Her blood sugar in the clinic today was:   Lab Results   Component Value Date    POCGLU 119 (A) 12/07/2023     Victoria Plaza presents today for follow up visit to discuss diabetes management with her daughter.     Per  Stu download, for the last 14 days:  Average glucose of 209 mg/dL. She is 44% in range. 31% of readings are high, with 25% of readings > 250. 0% hypoglycemia. Glucose variability of 40.2% with estimated GMI 8.3%. Overall, glycemic control remains above goal. I highly recommend moving forward with Stu 2 Plus that now integrates with her Tandem pump (historically has refused switch to Dexcom) which would give her access to the automated features via [employer], however she declines at this time. She would prefer to wait to see how her A1c trends.         Intermittent aphasia - Seeing Dr. Velasco, Neurology. ([facility]).     CKD II- Followed by renal routinely, Dr. Townsend.    GI -Gastroparesis- Dr. Jb Hyde. On Reglan. Chronic intermittent nausea and vomiting.      Vitamin D deficiency- Taking 50,000 international units twice monthly.     Hyperlipidemia-followed by cardiology, Dr. Sanches.     Hypothyroidism- Taking Synthroid 88 mcg daily. She feels Synthroid is causing her to gain weight. Stressed importance of taking Synthroid consistently, as untreated hypothyroidism causes weight gain.     Current diet: Carb counting  Activity Level: No structured exercise- limited due to chronic angina and COPD    A1c 5/8/2024 with PCP = 8.2% -- will request results  Lab Results   Component Value Date    HGBA1C 9.3 (H) 12/28/2023    HGBA1C 9.6 (H) 08/14/2023    HGBA1C 9.9 (H) 05/26/2023     STANDARDS OF CARE  Diabetes Management Status    Statin: Taking  ACE/ARB: Taking    Screening or Prevention Patient's value Goal Complete/Controlled?   HgA1C Testing and Control   Lab Results   Component Value Date    HGBA1C 9.3 (H) 12/28/2023      Annually/Less than 8% No   Lipid profile : 12/28/2023 Annually Yes   LDL control Lab Results   Component Value Date    LDLCALC 106 (H) 12/28/2023    Annually/Less than 100 mg/dl  No   Nephropathy screening Lab Results   Component Value Date    LABMICR 71 (H) 03/09/2022     Lab Results  "  Component Value Date    PROTEINUA Trace (A) 01/01/2018     No results found for: "UTPCR"   Annually No   Blood pressure BP Readings from Last 1 Encounters:   05/29/24 129/63    Less than 140/90 No   Dilated retinal exam : 07/27/2022 Annually Reports UTD- Dr. Escamilla,retinal specialist   Foot exam   : 07/25/2023 Annually Yes      Labs reviewed and are noted below.     Lab Results   Component Value Date    WBC 6.2 01/04/2018    HGB 12.6 01/04/2018    HCT 37.0 01/04/2018     01/04/2018    CHOL 189 12/28/2023    TRIG 179 (H) 12/28/2023    HDL 52 12/28/2023    LDLCALC 106 (H) 12/28/2023    ALT 19 05/26/2023    AST 13 05/26/2023     02/20/2024    K 4.5 02/20/2024     02/20/2024    ANIONGAP 15 02/20/2024    CREATININE 1.1 02/20/2024    ESTGFRAFRICA 69 08/13/2020    EGFRNONAA 69 01/20/2022    BUN 19 02/20/2024    CO2 20 (L) 02/20/2024    TSH 8.507 (H) 02/20/2024    INR 1.1 08/12/2020     (H) 02/20/2024    MICROALBUR 91.6 03/09/2022     Lab Results   Component Value Date    CPEPTIDE 0.57 (L) 02/20/2024    FREET4 0.86 02/20/2024    TSH 8.507 (H) 02/20/2024    GOEVUVQV74 512 02/20/2024    CALCIUM 9.3 02/20/2024    PHOS 2.9 12/09/2017     Lab Results   Component Value Date    CPEPTIDE 0.57 (L) 02/20/2024     No results found for: "GLUTAMICACID"  Glucose   Date Value Ref Range Status   02/20/2024 122 (H) 70 - 110 mg/dL Final     Anion Gap   Date Value Ref Range Status   02/20/2024 15 8 - 16 mmol/L Final     eGFR    Date Value Ref Range Status   08/13/2020 69 >=60 mL/min/1.73mSq Final     eGFR if non    Date Value Ref Range Status   01/20/2022 69 >59 mL/min/1.73 Final       The following portions of the patient's history were reviewed and updated as appropriate: allergies, current medications, past family history, past medical history, past social history, past surgical history and problem list.    Review of patient's allergies indicates:   Allergen Reactions    " Cephalexin Itching and Shortness Of Breath    Iodine and iodide containing products Hives, Itching and Shortness Of Breath    Latex, natural rubber Hives, Itching, Shortness Of Breath and Swelling     BREAKS OUT IN RASH      Monosodium glutamate Hives, Itching, Shortness Of Breath and Swelling     CAN'T BREATHE      Propofol analogues Shortness Of Breath    Adhesive Hives and Itching     Social History     Socioeconomic History    Marital status: Single   Tobacco Use    Smoking status: Never    Smokeless tobacco: Never   Substance and Sexual Activity    Alcohol use: Not Currently    Drug use: Never    Sexual activity: Not Currently     Social Determinants of Health     Financial Resource Strain: Unknown (8/11/2020)    Received from [employer] [facility] and Its Subsidiaries and Affiliates, [employer] [facility] and Its Subsidiaries and Affiliates    Overall Financial Resource Strain (CARDIA)     Difficulty of Paying Living Expenses: Patient declined   Food Insecurity: Unknown (8/11/2020)    Received from [employer] [facility] and Its Subsidiaries and Affiliates, [employer] [facility]    Hunger Vital Sign     Worried About Running Out of Food in the Last Year: Patient declined     Ran Out of Food in the Last Year: Patient declined   Transportation Needs: Unknown (8/11/2020)    Received from [employer] [facility] and Its Subsidiaries and Affiliates, [employer] [facility] and Its Subsidiaries and Affiliates    PRAPARE - Transportation     Lack of Transportation (Medical): Patient declined     Lack of Transportation (Non-Medical): Patient declined     Past Medical History:   Diagnosis Date    Diabetes mellitus, type 2      REVIEW OF SYSTEMS:  Eyes History of stable DROmid   Cardiovascular: History of HTN and HLD.  GI:  History of gastroparesis.   Neuro: Autonomic neuropathy. Issues with intermittent slurred speech/aphasia.  PSYCH: No tobacco use.  ENDO: See HPI.        Objective:      Vitals:    05/29/24 1108   BP: 129/63   Pulse: 86     RESPIRATORY: No respiratory distress  PSYCHIATRIC: Alert & oriented x3. Normal mood and affect.  NEUROLOGIC: Cranial nerves II-XII grossly intact.  FOOT EXAM:UTD    Assessment:       1. Uncontrolled type 2 diabetes mellitus with hyperglycemia, with long-term current use of insulin    2. Insulin pump in place    3. Hypothyroidism, unspecified type    4. Hyperlipidemia with target LDL less than 70    5. Vitamin D deficiency    6. Essential hypertension    7. Diabetic gastroparesis    8. Iron deficiency anemia, unspecified iron deficiency anemia type          Plan:   Victoria was seen today for diabetes mellitus.    Diagnoses and all orders for this visit:    Uncontrolled type 2 diabetes mellitus with hyperglycemia, with long-term current use of insulin  -     POCT Glucose, Hand-Held Device      Pump Back Up Plan Only:    In the event of pump failure, remove your pump and start a long-acting insulin (such as Lantus, Basaglar, etc-whichever is preferred on your plan) at 60 units once daily. This will cover the loss of basal needs through your pump. Do not re-start your pump until the time your long-acting insulin would have been due.    Using a syringe, withdraw from Novolog to cover your meals and any high blood sugar corrections:     To determine how much to cover a meal or snack, total up your carbs and divide by 4 = how much to cover a meal or snack.    For example, you are having a 50 gram meal.  50 / 4 = 12.5. You would take 13 units to cover this meal.    To determine how much to correct a high blood sugar: Take your current blood sugar and subtract your target blood sugar, 150. Then, divide by 25 = how much to correct a high blood sugar.    Insulin pump in place    No changes  "today.    Hypothyroidism, unspecified type  -     TSH; Future  -     T4, Free; Future    Hyperlipidemia with target LDL less than 70    Vitamin D deficiency  -     Vitamin D; Future    Essential hypertension    Diabetic gastroparesis    Iron deficiency anemia, unspecified iron deficiency anemia type      - Follow up: 3 months    Portions of this note may have been created with voice recognition software. Occasional "wrong-word" or "sound-a-like" substitutions may have occurred due to the inherent limitations of voice recognition software. Please, read the note carefully and recognize, using context, where substitutions have occurred.           Chapis Guzman,GLADISP-C, BC-ADM  [facility]  "

## 2024-06-20 DIAGNOSIS — Z79.4 UNCONTROLLED TYPE 2 DIABETES MELLITUS WITH HYPERGLYCEMIA, WITH LONG-TERM CURRENT USE OF INSULIN: ICD-10-CM

## 2024-06-20 DIAGNOSIS — E11.65 UNCONTROLLED TYPE 2 DIABETES MELLITUS WITH HYPERGLYCEMIA, WITH LONG-TERM CURRENT USE OF INSULIN: ICD-10-CM

## 2024-06-20 RX ORDER — ASPIRIN 325 MG
50000 TABLET, DELAYED RELEASE (ENTERIC COATED) ORAL
Qty: 12 CAPSULE | Refills: 1 | Status: SHIPPED | OUTPATIENT
Start: 2024-06-20

## 2024-06-30 DIAGNOSIS — E11.65 UNCONTROLLED TYPE 2 DIABETES MELLITUS WITH HYPERGLYCEMIA, WITH LONG-TERM CURRENT USE OF INSULIN: ICD-10-CM

## 2024-06-30 DIAGNOSIS — Z79.4 UNCONTROLLED TYPE 2 DIABETES MELLITUS WITH HYPERGLYCEMIA, WITH LONG-TERM CURRENT USE OF INSULIN: ICD-10-CM

## 2024-07-01 ENCOUNTER — TELEPHONE (OUTPATIENT)
Dept: DIABETES | Facility: CLINIC | Age: 64
End: 2024-07-01
Payer: MEDICAID

## 2024-07-01 RX ORDER — FLASH GLUCOSE SENSOR
1 KIT MISCELLANEOUS
Qty: 2 KIT | Refills: 11 | Status: SHIPPED | OUTPATIENT
Start: 2024-07-01

## 2024-07-01 NOTE — TELEPHONE ENCOUNTER
Hey apparently [facility] still saying this needs a PA but I see you have gotten it approved- can you check with them?    I sent a message to Nolberto to disregard what I had sent her. Thanks!

## 2024-07-01 NOTE — TELEPHONE ENCOUNTER
Hey can you check to see if we received the PA for Stu? She sent this note with her refill request: Need a PA to be filled and she has been out for 3 days now. They supposedly faxed it to us on Friday.

## 2024-07-09 DIAGNOSIS — E03.9 HYPOTHYROIDISM, UNSPECIFIED TYPE: ICD-10-CM

## 2024-07-09 RX ORDER — LEVOTHYROXINE SODIUM 88 UG/1
TABLET ORAL
Qty: 30 TABLET | Refills: 5 | Status: SHIPPED | OUTPATIENT
Start: 2024-07-09

## 2024-07-16 ENCOUNTER — PATIENT MESSAGE (OUTPATIENT)
Dept: DIABETES | Facility: CLINIC | Age: 64
End: 2024-07-16
Payer: MEDICAID

## 2024-08-29 ENCOUNTER — PATIENT MESSAGE (OUTPATIENT)
Dept: DIABETES | Facility: CLINIC | Age: 64
End: 2024-08-29
Payer: MEDICAID

## 2024-09-02 DIAGNOSIS — E11.65 UNCONTROLLED TYPE 2 DIABETES MELLITUS WITH HYPERGLYCEMIA, WITH LONG-TERM CURRENT USE OF INSULIN: ICD-10-CM

## 2024-09-02 DIAGNOSIS — Z79.4 UNCONTROLLED TYPE 2 DIABETES MELLITUS WITH HYPERGLYCEMIA, WITH LONG-TERM CURRENT USE OF INSULIN: ICD-10-CM

## 2024-09-03 RX ORDER — INSULIN ASPART 100 [IU]/ML
INJECTION, SOLUTION INTRAVENOUS; SUBCUTANEOUS
Qty: 40 ML | Refills: 5 | Status: SHIPPED | OUTPATIENT
Start: 2024-09-03

## 2024-09-19 ENCOUNTER — OFFICE VISIT (OUTPATIENT)
Dept: DIABETES | Facility: CLINIC | Age: 64
End: 2024-09-19
Payer: MEDICAID

## 2024-09-19 VITALS
SYSTOLIC BLOOD PRESSURE: 144 MMHG | HEART RATE: 70 BPM | HEIGHT: 62 IN | BODY MASS INDEX: 38.17 KG/M2 | DIASTOLIC BLOOD PRESSURE: 81 MMHG | WEIGHT: 207.44 LBS

## 2024-09-19 DIAGNOSIS — Z79.4 UNCONTROLLED TYPE 2 DIABETES MELLITUS WITH HYPERGLYCEMIA, WITH LONG-TERM CURRENT USE OF INSULIN: Primary | ICD-10-CM

## 2024-09-19 DIAGNOSIS — Z96.41 INSULIN PUMP IN PLACE: ICD-10-CM

## 2024-09-19 DIAGNOSIS — E03.9 HYPOTHYROIDISM, UNSPECIFIED TYPE: ICD-10-CM

## 2024-09-19 DIAGNOSIS — E55.9 VITAMIN D DEFICIENCY: ICD-10-CM

## 2024-09-19 DIAGNOSIS — D50.9 IRON DEFICIENCY ANEMIA, UNSPECIFIED IRON DEFICIENCY ANEMIA TYPE: ICD-10-CM

## 2024-09-19 DIAGNOSIS — K31.84 DIABETIC GASTROPARESIS: ICD-10-CM

## 2024-09-19 DIAGNOSIS — E11.43 DIABETIC GASTROPARESIS: ICD-10-CM

## 2024-09-19 DIAGNOSIS — E78.5 HYPERLIPIDEMIA WITH TARGET LDL LESS THAN 70: ICD-10-CM

## 2024-09-19 DIAGNOSIS — E11.65 UNCONTROLLED TYPE 2 DIABETES MELLITUS WITH HYPERGLYCEMIA, WITH LONG-TERM CURRENT USE OF INSULIN: Primary | ICD-10-CM

## 2024-09-19 DIAGNOSIS — I10 ESSENTIAL HYPERTENSION: ICD-10-CM

## 2024-09-19 PROCEDURE — 1159F MED LIST DOCD IN RCRD: CPT | Mod: CPTII,,, | Performed by: NURSE PRACTITIONER

## 2024-09-19 PROCEDURE — 1160F RVW MEDS BY RX/DR IN RCRD: CPT | Mod: CPTII,,, | Performed by: NURSE PRACTITIONER

## 2024-09-19 PROCEDURE — 3052F HG A1C>EQUAL 8.0%<EQUAL 9.0%: CPT | Mod: CPTII,,, | Performed by: NURSE PRACTITIONER

## 2024-09-19 PROCEDURE — G2211 COMPLEX E/M VISIT ADD ON: HCPCS | Mod: S$PBB,,, | Performed by: NURSE PRACTITIONER

## 2024-09-19 PROCEDURE — 99215 OFFICE O/P EST HI 40 MIN: CPT | Mod: PBBFAC | Performed by: NURSE PRACTITIONER

## 2024-09-19 PROCEDURE — 99999 PR PBB SHADOW E&M-EST. PATIENT-LVL V: CPT | Mod: PBBFAC,,, | Performed by: NURSE PRACTITIONER

## 2024-09-19 PROCEDURE — 3079F DIAST BP 80-89 MM HG: CPT | Mod: CPTII,,, | Performed by: NURSE PRACTITIONER

## 2024-09-19 PROCEDURE — 99214 OFFICE O/P EST MOD 30 MIN: CPT | Mod: S$PBB,,, | Performed by: NURSE PRACTITIONER

## 2024-09-19 PROCEDURE — 3077F SYST BP >= 140 MM HG: CPT | Mod: CPTII,,, | Performed by: NURSE PRACTITIONER

## 2024-09-19 PROCEDURE — 3008F BODY MASS INDEX DOCD: CPT | Mod: CPTII,,, | Performed by: NURSE PRACTITIONER

## 2024-09-19 NOTE — PROGRESS NOTES
Subjective:         Patient ID: Victoria Plaza is a 63 y.o. female.  Patient's current PCP is No, Primary Doctor.     Chief Complaint: Diabetes Mellitus    HPI  Victoria Plaza is a 63 y.o. Black or  female presenting for a follow up for diabetes. Patient has been diagnosed with type 2 diabetes since age 34 .    Comprehensive diabetes education completed through [facility] 2018.    CURRENT DM MEDICATIONS:   Novolog per Tandem TSlim x2 insulin pump- since 09/2018 (Supplies-[facility])  Pump Profile Settings - Profile 1  Active at upload  Time Basal Rate (u/hr) Correction Factor (u:mg/dL) Carb Ratio (u:grams) Target BG (mg/dL)   12:00 AM 2.100 1:28 1:3.6 150   2:00 AM 2.300 1:25 1:3.4 150   6:00 AM 2.100 1:22 1:3.6 150   12:00 PM 1.800 1:22 1:3.4 150     Total Daily Basal: 47.600 u  Insulin Duration: 5 hr  Carbohydrates:On    Diabetes Medications               insulin (LANTUS SOLOSTAR U-100 INSULIN) glargine 100 units/mL (3mL) SubQ pen In the event of pump failure only, remove pump and start Lantus 60 units daily. Do not re-start pump until the time Lantus would have been due.    insulin aspart, niacinamide, (FIASP FLEXTOUCH U-100 INSULIN) 100 unit/mL (3 mL) InPn For use with meals and corrections for pump back up plan    NOVOLOG U-100 INSULIN ASPART 100 unit/mL injection INJECT 120 UNITS PER PUMP ONCE DAILY     Past failed treatment include: Humulin R,Lantus,Basaglar - Failure to control DM    Blood glucose testing  Continuous per Stu (reader)/[facility]  Preferred lab: [facility]     Any episodes of hypoglycemia? 0% per Stu // Declines Dexcom but now open to Stu 2 Plus    Complications related to diabetes: nephropathy, retinopathy, autonomic neuropathy and peripheral neuropathy    Her blood sugar in the clinic today was:   Lab Results   Component Value Date    POCGLU 119 (A) 12/07/2023     Victoria Plaza presents today for follow up visit to discuss diabetes management with her daughter.      Hospitalized for DKA between visits -- diagnosed with UTI, and patient also states had multiple pump site occlusions.    Per Stu download, for the last 14 days:  Average glucose of 201 mg/dL. She is within target range 44% of the time. 32% of readings are high, with 24% of readings > 250. 0% hypoglycemia. Glucose variability of 35.7%. Estimated GMI 8.1%. Pattern of postprandial hyperglycemia. Based on review, plan to upgrade CGM to Stu 2 Plus which will now be compatible with her pump -- patient advised to update pump through her [employer]:Connect portal and to let me know when complete so I can update sensor. She would greatly benefit from automated feature. She will need a new training.        Intermittent aphasia - Seeing Dr. Velasco, Neurology. ([facility]).     CKD II- Followed by renal routinely, Dr. Townsend.    GI -Gastroparesis- Dr. Jb Hyde. On Reglan. Chronic intermittent nausea and vomiting.      Vitamin D deficiency- Taking 50,000 international units twice monthly.     Hyperlipidemia-followed by cardiology, Dr. Sanches.     Hypothyroidism- Taking Synthroid 88 mcg daily. She feels Synthroid is causing her to gain weight. Stressed importance of taking Synthroid consistently, as untreated hypothyroidism causes weight gain.     Current diet: Carb counting  Activity Level: No structured exercise- limited due to chronic angina and COPD    A1c 5/8/2024 with PCP = 8.2% -- will request results  Lab Results   Component Value Date    HGBA1C 8.6 (H) 08/31/2024    HGBA1C 9.3 (H) 12/28/2023    HGBA1C 9.6 (H) 08/14/2023     STANDARDS OF CARE  Diabetes Management Status    Statin: Taking  ACE/ARB: Taking    Screening or Prevention Patient's value Goal Complete/Controlled?   HgA1C Testing and Control   Lab Results   Component Value Date    HGBA1C 8.6 (H) 08/31/2024      Annually/Less than 8% No   Lipid profile : 12/28/2023 Annually Yes   LDL control Lab Results   Component Value Date    LDLCALC 106 (H) 12/28/2023     "Annually/Less than 100 mg/dl  No   Nephropathy screening Lab Results   Component Value Date    LABMICR 71 (H) 03/09/2022     Lab Results   Component Value Date    PROTEINUA Trace (A) 01/01/2018     No results found for: "UTPCR"   Annually No   Blood pressure BP Readings from Last 1 Encounters:   05/29/24 129/63    Less than 140/90 No   Dilated retinal exam : 07/27/2022 Annually Reports UTD- Dr. Escamilla,retinal specialist   Foot exam   : 07/25/2023 Annually Yes      Labs reviewed and are noted below.     Lab Results   Component Value Date    WBC 6.2 01/04/2018    HGB 12.6 01/04/2018    HCT 37.0 01/04/2018     01/04/2018    CHOL 189 12/28/2023    TRIG 179 (H) 12/28/2023    HDL 52 12/28/2023    LDLCALC 106 (H) 12/28/2023    ALT 19 05/26/2023    AST 13 05/26/2023     (L) 09/01/2024    K 4.2 09/01/2024     09/01/2024    ANIONGAP 9 09/01/2024    CREATININE 1.14 (H) 09/01/2024    ESTGFRAFRICA 54 09/01/2024    EGFRNONAA 69 01/20/2022    BUN 19 09/01/2024    CO2 17 (L) 09/01/2024    TSH 8.507 (H) 02/20/2024    INR 1.1 08/12/2020     (H) 02/20/2024    MICROALBUR 91.6 03/09/2022     Lab Results   Component Value Date    CPEPTIDE 0.57 (L) 02/20/2024    FREET4 0.86 02/20/2024    TSH 8.507 (H) 02/20/2024    THYROPEROXID <8 09/01/2021    BGNIEPGT52 512 02/20/2024    CALCIUM 8.6 (L) 09/01/2024    PHOS 2.9 12/09/2017     Lab Results   Component Value Date    CPEPTIDE 0.57 (L) 02/20/2024     No results found for: "GLUTAMICACID"  Glucose   Date Value Ref Range Status   02/20/2024 122 (H) 70 - 110 mg/dL Final     Anion Gap   Date Value Ref Range Status   09/01/2024 9 8 - 16 mmol/L Final   02/20/2024 15 8 - 16 mmol/L Final     eGFR    Date Value Ref Range Status   09/01/2024 54 mL/min/1.73mSq Final     Comment:     In accordance with NKF-ASN Task Force recommendation, calculation based on the Chronic Kidney Disease Epidemiology Collaboration (CKD-EPI) equation without adjustment for race. eGFR " adjusted for gender and age and calculated in ml/min/1.73mSquared. eGFR cannot be calculated if patient is under 18 years of age.     Reference Range:   >= 60 ml/min/1.73mSquared.     eGFR if non    Date Value Ref Range Status   01/20/2022 69 >59 mL/min/1.73 Final     The following portions of the patient's history were reviewed and updated as appropriate: allergies, current medications, past family history, past medical history, past social history, past surgical history and problem list.    Review of patient's allergies indicates:   Allergen Reactions    Cephalexin Itching and Shortness Of Breath    Iodine and iodide containing products Hives, Itching and Shortness Of Breath    Latex, natural rubber Hives, Itching, Shortness Of Breath and Swelling     BREAKS OUT IN RASH      Monosodium glutamate Hives, Itching, Shortness Of Breath and Swelling     CAN'T BREATHE      Propofol analogues Shortness Of Breath    Adhesive Hives and Itching     Social History     Socioeconomic History    Marital status: Single   Tobacco Use    Smoking status: Never    Smokeless tobacco: Never   Substance and Sexual Activity    Alcohol use: Not Currently    Drug use: Never    Sexual activity: Not Currently     Social Determinants of Health     Financial Resource Strain: Unknown (8/11/2020)    Received from [facility] Subsidiaries and Affiliates, [employer] [facility] Subsidiaries and Affiliates    Overall Financial Resource Strain (CARDIA)     Difficulty of Paying Living Expenses: Patient declined   Food Insecurity: Unknown (8/11/2020)    Received from [facility] Subsidiaries and Affiliates, [employer] [facility] Its Subsidiaries and Affiliates    Hunger Vital Sign     Worried About Running Out of Food in the Last Year: Patient declined     Ran Out of Food in the  Last Year: Patient declined   Transportation Needs: Unknown (8/11/2020)    Received from [facility]idiaries and Affiliates, [facility] Subsidiaries and Affiliates    PRAPARE - Transportation     Lack of Transportation (Medical): Patient declined     Lack of Transportation (Non-Medical): Patient declined     Past Medical History:   Diagnosis Date    Diabetes mellitus, type 2      REVIEW OF SYSTEMS:  Eyes History of stable DR.   Cardiovascular: History of HTN and HLD.  GI: History of gastroparesis.   Neuro: Autonomic neuropathy. Issues with intermittent slurred speech/aphasia.  PSYCH: No tobacco use.  ENDO: See HPI.        Objective:      There were no vitals filed for this visit.  RESPIRATORY: No respiratory distress  PSYCHIATRIC: Alert & oriented x3. Normal mood and affect.  NEUROLOGIC: Cranial nerves II-XII grossly intact.  FOOT EXAM:UTD    Assessment:       1. Uncontrolled type 2 diabetes mellitus with hyperglycemia, with long-term current use of insulin    2. Insulin pump in place    3. Hypothyroidism, unspecified type    4. Hyperlipidemia with target LDL less than 70    5. Vitamin D deficiency    6. Essential hypertension    7. Diabetic gastroparesis    8. Iron deficiency anemia, unspecified iron deficiency anemia type            Plan:   Victoria was seen today for diabetes mellitus.    Diagnoses and all orders for this visit:    Uncontrolled type 2 diabetes mellitus with hyperglycemia, with long-term current use of insulin  -     POCT Glucose, Hand-Held Device  -     Lipid Panel; Future  -     TSH; Future  -     T4, Free; Future  -     Lipid Panel  -     TSH  -     T4, Free    Labs at [facility], fasting, when able. Update pump through T:Connect portal so we can upgrade sensor to SteadyFare 2 Plus in order to have access to automated features with your pump.    Pump Back Up Plan Only:    In the event of pump failure,  "remove your pump and start a long-acting insulin (such as Lantus, Basaglar, etc-whichever is preferred on your plan) at 60 units once daily. This will cover the loss of basal needs through your pump. Do not re-start your pump until the time your long-acting insulin would have been due.    Using a syringe, withdraw from Novolog to cover your meals and any high blood sugar corrections:     To determine how much to cover a meal or snack, total up your carbs and divide by 4 = how much to cover a meal or snack.    For example, you are having a 50 gram meal.  50 / 4 = 12.5. You would take 13 units to cover this meal.    To determine how much to correct a high blood sugar: Take your current blood sugar and subtract your target blood sugar, 150. Then, divide by 25 = how much to correct a high blood sugar.    Insulin pump in place    Pump Profile Settings - Profile 1  Active at upload  Time Basal Rate (u/hr) Correction Factor (u:mg/dL) Carb Ratio (u:grams) Target BG (mg/dL)   12:00 AM 2.100 1:28 1:3.6 150   2:00 AM 2.300 1:25 1:3.4 150   6:00 AM 2.100 1:22 1:3.6 150   12:00 PM 1.800 1:22 1:3.4 150     Total Daily Basal: 47.600 u  Insulin Duration: 5 hr  Carbohydrates:On    Hypothyroidism, unspecified type  -     TSH; Future  -     T4, Free; Future  -     TSH  -     T4, Free    Hyperlipidemia with target LDL less than 70    Vitamin D deficiency  -     Vitamin D; Future  -     Vitamin D    Essential hypertension    Diabetic gastroparesis    Iron deficiency anemia, unspecified iron deficiency anemia type        - Follow up: 3 months    Portions of this note may have been created with voice recognition software. Occasional "wrong-word" or "sound-a-like" substitutions may have occurred due to the inherent limitations of voice recognition software. Please, read the note carefully and recognize, using context, where substitutions have occurred.           JANE Garza, BC-ADM [facility]    "

## 2024-09-25 ENCOUNTER — TELEPHONE (OUTPATIENT)
Dept: DIABETES | Facility: CLINIC | Age: 64
End: 2024-09-25
Payer: MEDICAID

## 2024-09-26 ENCOUNTER — TELEPHONE (OUTPATIENT)
Dept: DIABETES | Facility: CLINIC | Age: 64
End: 2024-09-26
Payer: MEDICAID

## 2024-09-26 DIAGNOSIS — E03.9 HYPOTHYROIDISM, UNSPECIFIED TYPE: Primary | ICD-10-CM

## 2024-09-26 RX ORDER — LEVOTHYROXINE SODIUM 100 UG/1
100 TABLET ORAL
Qty: 30 TABLET | Refills: 2 | Status: SHIPPED | OUTPATIENT
Start: 2024-09-26

## 2024-09-26 NOTE — TELEPHONE ENCOUNTER
----- Message from Chapis Guzman NP sent at 9/26/2024  3:51 PM CDT -----  We need to increase her dose and re-check thyroid function studies in 6 weeks. Where would she like to complete the re-check and verify pharmacy for new dose of Levothyroxine.  ----- Message -----  From: Stephanie Serrano LPN  Sent: 9/26/2024   3:04 PM CDT  To: Chapis Guzman NP    Pt informed of lab results and provider recommendation at this time. Pt voiced she is taking Synthroid every morning and she is not taking Liptor daily.  ----- Message -----  From: Chapis Guzman NP  Sent: 9/26/2024   1:45 PM CDT  To: [facility] Staff    Find out if she is taking her Synthroid 88 mcg daily, consistently, on an empty stomach at least 30 minutes before eating/drinking in the morning? Her thyroid stimulating hormone remains elevated, which is indicative of not enough thyroid hormone. Vitamin D level is normal. Cholesterol panel is a little above goal. Has she been taking her Lipitor every day also?

## 2024-09-26 NOTE — TELEPHONE ENCOUNTER
Received request from [employer]. Order submitted through [place] for pump supplies. Sent to [facility] for approval

## 2024-09-26 NOTE — TELEPHONE ENCOUNTER
Informed pt of new recommendation. Pt voiced understanding. Would like script sent to [facility]. [email or web address]

## 2024-09-26 NOTE — TELEPHONE ENCOUNTER
Noted. Synthroid 100 mcg sent to preferred pharmacy, and lab orders sent to [facility] to be completed in 6 weeks after starting new dose.

## 2024-10-03 DIAGNOSIS — E78.5 HYPERLIPIDEMIA WITH TARGET LDL LESS THAN 70: ICD-10-CM

## 2024-10-03 RX ORDER — ATORVASTATIN CALCIUM 10 MG/1
TABLET, FILM COATED ORAL
Qty: 30 TABLET | Refills: 5 | Status: SHIPPED | OUTPATIENT
Start: 2024-10-03

## 2024-10-07 ENCOUNTER — TELEPHONE (OUTPATIENT)
Dept: DIABETES | Facility: CLINIC | Age: 64
End: 2024-10-07
Payer: MEDICAID

## 2024-10-07 NOTE — TELEPHONE ENCOUNTER
Incoming prior auth for Fiasp not sent to plan Novolog prior auth not sent to plan on preferred drug-Novolog

## 2024-10-15 ENCOUNTER — TELEPHONE (OUTPATIENT)
Dept: DIABETES | Facility: CLINIC | Age: 64
End: 2024-10-15
Payer: MEDICAID

## 2024-10-15 NOTE — TELEPHONE ENCOUNTER
----- Message from Amaris sent at 10/14/2024  2:33 PM CDT -----  Type:  Needs Medical Advice    Who Called: [facility]  Would the patient rather a call back or a response via [facility]? call  Best Call Back Number:   Additional Information: following up for updated medical records for pt and certificate of medical necessity

## 2024-10-23 ENCOUNTER — TELEPHONE (OUTPATIENT)
Dept: DIABETES | Facility: CLINIC | Age: 64
End: 2024-10-23
Payer: MEDICAID

## 2024-10-23 NOTE — TELEPHONE ENCOUNTER
----- Message from [employer] sent at 10/23/2024 11:55 AM CDT -----  Type: General Call Back         Name of Caller:Kassie from [facility]  Would the patient rather a call back or a response via [facility]? call  Best Call Back Number:[phone]  Additional Information: Alison Calling in regards to pt's insulin supply form that needs to refaxed. Notify [facility] by phone number and refax back form if received.  Fax# [phone]

## 2024-10-23 NOTE — TELEPHONE ENCOUNTER
Form received from [employer]. Pt insulin pump supplies submitted to [employer] via [place] on 10/23/24.

## 2024-11-20 DIAGNOSIS — E11.65 UNCONTROLLED TYPE 2 DIABETES MELLITUS WITH HYPERGLYCEMIA, WITH LONG-TERM CURRENT USE OF INSULIN: ICD-10-CM

## 2024-11-20 DIAGNOSIS — Z79.4 UNCONTROLLED TYPE 2 DIABETES MELLITUS WITH HYPERGLYCEMIA, WITH LONG-TERM CURRENT USE OF INSULIN: ICD-10-CM

## 2024-11-20 RX ORDER — ASPIRIN 325 MG
50000 TABLET, DELAYED RELEASE (ENTERIC COATED) ORAL
Qty: 12 CAPSULE | Refills: 3 | Status: SHIPPED | OUTPATIENT
Start: 2024-11-20 | End: 2024-11-20

## 2024-11-20 RX ORDER — ASPIRIN 325 MG
50000 TABLET, DELAYED RELEASE (ENTERIC COATED) ORAL
Qty: 12 CAPSULE | Refills: 1 | Status: SHIPPED | OUTPATIENT
Start: 2024-11-20

## 2024-12-04 ENCOUNTER — PATIENT MESSAGE (OUTPATIENT)
Dept: DIABETES | Facility: CLINIC | Age: 64
End: 2024-12-04
Payer: MEDICARE

## 2024-12-05 NOTE — TELEPHONE ENCOUNTER
Does this mean we need to re-send to a different DME? She says [facility]? I believe the one I signed today was for [facility].

## 2024-12-16 DIAGNOSIS — E03.9 HYPOTHYROIDISM, UNSPECIFIED TYPE: ICD-10-CM

## 2024-12-16 RX ORDER — LEVOTHYROXINE SODIUM 100 UG/1
100 TABLET ORAL
Qty: 30 TABLET | Refills: 5 | Status: SHIPPED | OUTPATIENT
Start: 2024-12-16

## 2024-12-17 ENCOUNTER — OFFICE VISIT (OUTPATIENT)
Dept: DIABETES | Facility: CLINIC | Age: 64
End: 2024-12-17
Payer: MEDICARE

## 2024-12-17 VITALS
BODY MASS INDEX: 38.38 KG/M2 | SYSTOLIC BLOOD PRESSURE: 182 MMHG | HEART RATE: 79 BPM | DIASTOLIC BLOOD PRESSURE: 89 MMHG | HEIGHT: 62 IN | WEIGHT: 208.56 LBS

## 2024-12-17 DIAGNOSIS — K31.84 DIABETIC GASTROPARESIS: ICD-10-CM

## 2024-12-17 DIAGNOSIS — E78.5 HYPERLIPIDEMIA WITH TARGET LDL LESS THAN 70: ICD-10-CM

## 2024-12-17 DIAGNOSIS — Z79.4 UNCONTROLLED TYPE 2 DIABETES MELLITUS WITH HYPERGLYCEMIA, WITH LONG-TERM CURRENT USE OF INSULIN: Primary | ICD-10-CM

## 2024-12-17 DIAGNOSIS — E11.65 UNCONTROLLED TYPE 2 DIABETES MELLITUS WITH HYPERGLYCEMIA, WITH LONG-TERM CURRENT USE OF INSULIN: Primary | ICD-10-CM

## 2024-12-17 DIAGNOSIS — I10 ESSENTIAL HYPERTENSION: ICD-10-CM

## 2024-12-17 DIAGNOSIS — E03.9 HYPOTHYROIDISM, UNSPECIFIED TYPE: ICD-10-CM

## 2024-12-17 DIAGNOSIS — D50.9 IRON DEFICIENCY ANEMIA, UNSPECIFIED IRON DEFICIENCY ANEMIA TYPE: ICD-10-CM

## 2024-12-17 DIAGNOSIS — E11.43 DIABETIC GASTROPARESIS: ICD-10-CM

## 2024-12-17 DIAGNOSIS — Z96.41 INSULIN PUMP IN PLACE: ICD-10-CM

## 2024-12-17 DIAGNOSIS — E55.9 VITAMIN D DEFICIENCY: ICD-10-CM

## 2024-12-17 LAB — GLUCOSE SERPL-MCNC: 270 MG/DL (ref 70–110)

## 2024-12-17 PROCEDURE — 99214 OFFICE O/P EST MOD 30 MIN: CPT | Mod: S$PBB,,, | Performed by: NURSE PRACTITIONER

## 2024-12-17 PROCEDURE — 99999PBSHW POCT GLUCOSE, HAND-HELD DEVICE: Mod: PBBFAC,,,

## 2024-12-17 PROCEDURE — 99215 OFFICE O/P EST HI 40 MIN: CPT | Mod: PBBFAC | Performed by: NURSE PRACTITIONER

## 2024-12-17 PROCEDURE — 99999 PR PBB SHADOW E&M-EST. PATIENT-LVL V: CPT | Mod: PBBFAC,,, | Performed by: NURSE PRACTITIONER

## 2024-12-17 PROCEDURE — 82962 GLUCOSE BLOOD TEST: CPT | Mod: PBBFAC | Performed by: NURSE PRACTITIONER

## 2024-12-17 NOTE — PATIENT INSTRUCTIONS
Follow-up in 3 months.    Pump Back Up Plan Only:    In the event of pump failure, remove your pump and start a long-acting insulin (such as Lantus, Basaglar, etc-whichever is preferred on your plan) at 60 units once daily. This will cover the loss of basal needs through your pump. Do not re-start your pump until the time your long-acting insulin would have been due.    Using a syringe, withdraw from Novolog to cover your meals and any high blood sugar corrections:     To determine how much to cover a meal or snack, total up your carbs and divide by 4 = how much to cover a meal or snack.    For example, you are having a 50 gram meal.  50 / 4 = 12.5. You would take 13 units to cover this meal.    To determine how much to correct a high blood sugar: Take your current blood sugar and subtract your target blood sugar, 150. Then, divide by 25 = how much to correct a high blood sugar.    For example, your starting blood sugar is 275.  275-150 = 125.  125/25 = 5 units. You would need 5 units to correct a high blood sugar of 275.    If you are eating and your blood sugar is high, add the totals together and take before the meal.    Current pump settings:    Pump Profile Settings - Profile 1 Active at upload  Time Basal Rate (u/hr) Correction Factor (u:mg/dL) Carb Ratio (u:grams) Target BG (mg/dL)  12:00 AM 2.300 1:28 1:3.4 150  2:00 AM 2.500 1:25 1:3.2 150  6:00 AM 2.300 1:22 1:3.4 150  12:00 PM 1.800 1:22 1:3.2 150

## 2024-12-17 NOTE — PROGRESS NOTES
Subjective:         Patient ID: Victoria Plaza is a 64 y.o. female.  Patient's current PCP is No, Primary Doctor.     Chief Complaint: Diabetes Mellitus    HPI  Victoria Plaza is a 64 y.o. Black or  female presenting for a follow up for diabetes. Patient has been diagnosed with type 2 diabetes since age 34 .    Comprehensive diabetes education completed through [facility] 2018.    CURRENT DM MEDICATIONS:   Novolog per Tandem TSlim x2 insulin pump- since 09/2018 (Supplies-[place])  Pump Profile settings - Profile 1 Active at upload  Time Basal Rate (units/hr) Correction Factor (units:mg/dL) Carb Ratio (units:grams) Target BG (mg/dL)  12:00 AM 2.100 1:28 1:3.6 150  2:00 AM 2.300 1:25 1:3.4 150  6:00 AM 2.100 1:22 1:3.6 150  12:00 PM 1.800 1:22 1:3.4 150  Total Daily Basal: 47.600 units Insulin Duration: 5 hr Carbohydrates:On    Diabetes Medications               insulin (LANTUS SOLOSTAR U-100 INSULIN) glargine 100 units/mL (3mL) SubQ pen In the event of pump failure only, remove pump and start Lantus 60 units daily. Do not re-start pump until the time Lantus would have been due.    insulin aspart, niacinamide, (FIASP FLEXTOUCH U-100 INSULIN) 100 unit/mL (3 mL) InPn For use with meals and corrections for pump back up plan    NOVOLOG U-100 INSULIN ASPART 100 unit/mL injection INJECT 120 UNITS PER PUMP ONCE DAILY     Past failed treatment include: Humulin R,Lantus,Basaglar - Failure to control DM    Blood glucose testing  Continuous per Stu 2 (reader) -- supplies now through [employer]  Preferred lab: [facility]     Any episodes of hypoglycemia? 0% per Stu     Complications related to diabetes: nephropathy, retinopathy, autonomic neuropathy and peripheral neuropathy    Her blood sugar in the clinic today was:   Lab Results   Component Value Date    POCGLU 270 (A) 12/17/2024     Victoria Plaza presents today for follow up visit to discuss diabetes management with her daughter.     Now on Medicare - [employer]  will continue pump supplies.   Stu supplies now through [employer].     She recently received a lot of supplies for Stu 2. We discussed switching to Stu 2 Plus that will communicate with her Tandem pump in the future, but she's interested in Stu 3 Plus which does not yet communicate with Tandem yet - will re-assess this in March.     She reports recent A1c with PCP 8.3%- continuing to slowly improve.    Per Stu download, for the last 14 days:  Average glucose of 217 mg/dL. She is 36% in range. 30% of readings are high, with 34% of readings > 250. 0% hypoglycemia. Glucose variability of 35.0%.  Consistent postprandial hyperglycemia noted- carb ratios adjusted today.           Intermittent aphasia - Seeing Dr. Velasco, Neurology. ([facility]).     CKD II- Followed by renal routinely, Dr. Townsend.    GI -Gastroparesis- Dr. Jb Hyde. On Reglan. Chronic intermittent nausea and vomiting.      Vitamin D deficiency- Taking 50,000 international units twice monthly.     Hyperlipidemia-followed by cardiology, Dr. Sanches.     Hypothyroidism- Taking Synthroid 100 mcg daily - TFTs are now normal.    Current diet: Carb counting  Activity Level: No structured exercise- limited due to chronic angina and COPD      Lab Results   Component Value Date    HGBA1C 8.6 (H) 08/31/2024    HGBA1C 9.3 (H) 12/28/2023    HGBA1C 9.6 (H) 08/14/2023     STANDARDS OF CARE  Diabetes Management Status    Statin: Taking  ACE/ARB: Taking    Screening or Prevention Patient's value Goal Complete/Controlled?   HgA1C Testing and Control   Lab Results   Component Value Date    HGBA1C 8.6 (H) 08/31/2024      Annually/Less than 8% No   Lipid profile : 09/24/2024 Annually Yes   LDL control Lab Results   Component Value Date    LDLCALC 130 (H) 09/24/2024    Annually/Less than 100 mg/dl  No   Nephropathy screening Lab Results   Component Value Date    LABMICR 71 (H) 03/09/2022     Lab Results   Component Value Date    PROTEINUA Trace (A) 01/01/2018     No  "results found for: "UTPCR"   Annually No   Blood pressure BP Readings from Last 1 Encounters:   12/17/24 (!) 182/89    Less than 140/90 No   Dilated retinal exam : 07/27/2022 Annually Reports UTD- Dr. Escamilla,retinal specialist   Foot exam   : 07/25/2023 Annually Yes      Labs reviewed and are noted below.     Lab Results   Component Value Date    WBC 6.2 01/04/2018    HGB 12.6 01/04/2018    HCT 37.0 01/04/2018     01/04/2018    CHOL 200 (H) 09/24/2024    TRIG 115 09/24/2024    HDL 49 09/24/2024    LDLCALC 130 (H) 09/24/2024    ALT 19 05/26/2023    AST 13 05/26/2023     (L) 09/01/2024    K 4.2 09/01/2024     09/01/2024    ANIONGAP 9 09/01/2024    CREATININE 1.14 (H) 09/01/2024    ESTGFRAFRICA 54 09/01/2024    EGFRNONAA 69 01/20/2022    BUN 19 09/01/2024    CO2 17 (L) 09/01/2024    TSH 2.370 12/16/2024    INR 1.1 08/12/2020     (H) 02/20/2024    MICROALBUR 91.6 03/09/2022     Lab Results   Component Value Date    CPEPTIDE 0.57 (L) 02/20/2024    FREET4 0.86 02/20/2024    TSH 2.370 12/16/2024    THYROPEROXID <8 09/01/2021    PUEXPZGP59 512 02/20/2024    CALCIUM 8.6 (L) 09/01/2024    PHOS 2.9 12/09/2017     Lab Results   Component Value Date    CPEPTIDE 0.57 (L) 02/20/2024     No results found for: "GLUTAMICACID"  Glucose   Date Value Ref Range Status   02/20/2024 122 (H) 70 - 110 mg/dL Final     Anion Gap   Date Value Ref Range Status   09/01/2024 9 8 - 16 mmol/L Final   02/20/2024 15 8 - 16 mmol/L Final     eGFR    Date Value Ref Range Status   09/01/2024 54 mL/min/1.73mSq Final     Comment:     In accordance with NKF-ASN Task Force recommendation, calculation based on the Chronic Kidney Disease Epidemiology Collaboration (CKD-EPI) equation without adjustment for race. eGFR adjusted for gender and age and calculated in ml/min/1.73mSquared. eGFR cannot be calculated if patient is under 18 years of age.     Reference Range:   >= 60 ml/min/1.73mSquared.     eGFR if non African " American   Date Value Ref Range Status   01/20/2022 69 >59 mL/min/1.73 Final     The following portions of the patient's history were reviewed and updated as appropriate: allergies, current medications, past family history, past medical history, past social history, past surgical history and problem list.    Review of patient's allergies indicates:   Allergen Reactions    Cephalexin Itching and Shortness Of Breath    Iodine and iodide containing products Hives, Itching and Shortness Of Breath    Latex, natural rubber Hives, Itching, Shortness Of Breath and Swelling     BREAKS OUT IN RASH      Monosodium glutamate Hives, Itching, Shortness Of Breath and Swelling     CAN'T BREATHE      Propofol analogues Shortness Of Breath    Adhesive Hives and Itching     Social History     Socioeconomic History    Marital status: Single   Tobacco Use    Smoking status: Never    Smokeless tobacco: Never   Substance and Sexual Activity    Alcohol use: Not Currently    Drug use: Never    Sexual activity: Not Currently     Social Drivers of Health     Financial Resource Strain: Unknown (8/11/2020)    Received from [facility] and Its Subsidiaries and Affiliates, [facility] Subsidiaries and Affiliates    Overall Financial Resource Strain (CARDIA)     Difficulty of Paying Living Expenses: Patient declined   Food Insecurity: Unknown (8/11/2020)    Received from "[employer] and Its Subsidiaries and Affiliates, [facility] and Its Subsidiaries and Affiliates    Hunger Vital Sign     Worried About Running Out of Food in the Last Year: Patient declined     Ran Out of Food in the Last Year: Patient declined   Transportation Needs: Unknown (8/11/2020)    Received from "[employer] and Its Subsidiaries and Affiliates, [facility]  [facility] and Its Subsidiaries and Affiliates    [facility] - Transportation     Lack of Transportation (Medical): Patient declined     Lack of Transportation (Non-Medical): Patient declined     Past Medical History:   Diagnosis Date    Diabetes mellitus, type 2      REVIEW OF SYSTEMS:  Eyes History of stable DR.   Cardiovascular: History of HTN and HLD.  GI: History of gastroparesis.   Neuro: Autonomic neuropathy. Issues with intermittent slurred speech/aphasia.  PSYCH: No tobacco use.  ENDO: See HPI.        Objective:      Vitals:    12/17/24 1026   BP: (!) 182/89   Pulse: 79     RESPIRATORY: No respiratory distress  PSYCHIATRIC: Alert & oriented x3. Normal mood and affect.  NEUROLOGIC: Cranial nerves II-XII grossly intact.  FOOT EXAM:UTD    Assessment:       1. Uncontrolled type 2 diabetes mellitus with hyperglycemia, with long-term current use of insulin    2. Insulin pump in place    3. Hypothyroidism, unspecified type    4. Hyperlipidemia with target LDL less than 70    5. Essential hypertension    6. Vitamin D deficiency    7. Diabetic gastroparesis    8. Iron deficiency anemia, unspecified iron deficiency anemia type            Plan:   Victoria was seen today for diabetes mellitus.    Diagnoses and all orders for this visit:    Uncontrolled type 2 diabetes mellitus with hyperglycemia, with long-term current use of insulin  -     POCT Glucose, Hand-Held Device    Chronic - New settings (adjusted carb ratios) - see below.    Pump Back Up Plan Only:    In the event of pump failure, remove your pump and start a long-acting insulin (such as Lantus, Basaglar, etc-whichever is preferred on your plan) at 60 units once daily. This will cover the loss of basal needs through your pump. Do not re-start your pump until the time your long-acting insulin would have been due.    Using a syringe, withdraw from Novolog to cover your meals and any high blood sugar corrections:     To determine how much to cover a meal or  "snack, total up your carbs and divide by 4 = how much to cover a meal or snack.    For example, you are having a 50 gram meal.  50 / 4 = 12.5. You would take 13 units to cover this meal.    To determine how much to correct a high blood sugar: Take your current blood sugar and subtract your target blood sugar, 150. Then, divide by 25 = how much to correct a high blood sugar.    Insulin pump in place    Pump Profile Settings - Profile 1 Active at upload  Time Basal Rate (u/hr) Correction Factor (u:mg/dL) Carb Ratio (u:grams) Target BG (mg/dL)  12:00 AM 2.300 1:28 1:3.4 150  2:00 AM 2.500 1:25 1:3.2 150  6:00 AM 2.300 1:22 1:3.4 150  12:00 PM 1.800 1:22 1:3.2 150    Hypothyroidism, unspecified type    Hyperlipidemia with target LDL less than 70    Essential hypertension    Vitamin D deficiency    Diabetic gastroparesis    Iron deficiency anemia, unspecified iron deficiency anemia type        - Follow up: 3 months    Portions of this note may have been created with voice recognition software. Occasional "wrong-word" or "sound-a-like" substitutions may have occurred due to the inherent limitations of voice recognition software. Please, read the note carefully and recognize, using context, where substitutions have occurred.           JANE Garza, BC-ADM [facility]  "

## 2025-01-08 ENCOUNTER — TELEPHONE (OUTPATIENT)
Dept: DIABETES | Facility: CLINIC | Age: 65
End: 2025-01-08
Payer: MEDICARE

## 2025-01-08 NOTE — TELEPHONE ENCOUNTER
Incoming fax from [employer] regarding Novolog 100u.ml vial noting drug is not listed in formulary. Alternative is insulin aspart

## 2025-02-11 ENCOUNTER — PATIENT MESSAGE (OUTPATIENT)
Dept: DIABETES | Facility: CLINIC | Age: 65
End: 2025-02-11
Payer: MEDICARE

## 2025-02-18 ENCOUNTER — PATIENT MESSAGE (OUTPATIENT)
Dept: DIABETES | Facility: CLINIC | Age: 65
End: 2025-02-18
Payer: MEDICARE

## 2025-02-18 DIAGNOSIS — Z79.4 UNCONTROLLED TYPE 2 DIABETES MELLITUS WITH HYPERGLYCEMIA, WITH LONG-TERM CURRENT USE OF INSULIN: ICD-10-CM

## 2025-02-18 DIAGNOSIS — E11.65 UNCONTROLLED TYPE 2 DIABETES MELLITUS WITH HYPERGLYCEMIA, WITH LONG-TERM CURRENT USE OF INSULIN: ICD-10-CM

## 2025-02-18 RX ORDER — INSULIN ASPART INJECTION 100 [IU]/ML
INJECTION, SOLUTION SUBCUTANEOUS
Qty: 5 EACH | Refills: 5 | Status: SHIPPED | OUTPATIENT
Start: 2025-02-18

## 2025-03-25 ENCOUNTER — TELEPHONE (OUTPATIENT)
Dept: DIABETES | Facility: CLINIC | Age: 65
End: 2025-03-25
Payer: MEDICARE

## 2025-03-25 NOTE — TELEPHONE ENCOUNTER
PA Outcome: FIASP FLEXTOUCH U-100 INSULIN) 100 unit/mL (3 mL) InPn     Denied: Pt will have to try 2 out of 3 preferred insulins-Insulin Aspart, Novolin 70/30, Novolin n nph U-100, Novolin r regular u 100     Pt only tried Insulin Aspart. Pt scheduled 03/27

## 2025-03-27 ENCOUNTER — OFFICE VISIT (OUTPATIENT)
Dept: DIABETES | Facility: CLINIC | Age: 65
End: 2025-03-27
Payer: MEDICARE

## 2025-03-27 VITALS
WEIGHT: 208.13 LBS | HEIGHT: 62 IN | HEART RATE: 76 BPM | BODY MASS INDEX: 38.3 KG/M2 | SYSTOLIC BLOOD PRESSURE: 155 MMHG | DIASTOLIC BLOOD PRESSURE: 77 MMHG

## 2025-03-27 DIAGNOSIS — I10 ESSENTIAL HYPERTENSION: ICD-10-CM

## 2025-03-27 DIAGNOSIS — Z96.41 INSULIN PUMP IN PLACE: ICD-10-CM

## 2025-03-27 DIAGNOSIS — E11.65 UNCONTROLLED TYPE 2 DIABETES MELLITUS WITH HYPERGLYCEMIA, WITH LONG-TERM CURRENT USE OF INSULIN: Primary | ICD-10-CM

## 2025-03-27 DIAGNOSIS — E03.9 HYPOTHYROIDISM, UNSPECIFIED TYPE: ICD-10-CM

## 2025-03-27 DIAGNOSIS — Z79.4 UNCONTROLLED TYPE 2 DIABETES MELLITUS WITH HYPERGLYCEMIA, WITH LONG-TERM CURRENT USE OF INSULIN: Primary | ICD-10-CM

## 2025-03-27 LAB — GLUCOSE SERPL-MCNC: 99 MG/DL (ref 70–110)

## 2025-03-27 PROCEDURE — 99215 OFFICE O/P EST HI 40 MIN: CPT | Mod: PBBFAC | Performed by: NURSE PRACTITIONER

## 2025-03-27 PROCEDURE — 99999 PR PBB SHADOW E&M-EST. PATIENT-LVL V: CPT | Mod: PBBFAC,,, | Performed by: NURSE PRACTITIONER

## 2025-03-27 PROCEDURE — 99999PBSHW POCT GLUCOSE, HAND-HELD DEVICE: Mod: PBBFAC,,,

## 2025-03-27 PROCEDURE — 82962 GLUCOSE BLOOD TEST: CPT | Mod: PBBFAC | Performed by: NURSE PRACTITIONER

## 2025-03-27 RX ORDER — INSULIN ASPART 100 [IU]/ML
INJECTION, SOLUTION INTRAVENOUS; SUBCUTANEOUS
Qty: 40 ML | Refills: 11 | Status: SHIPPED | OUTPATIENT
Start: 2025-03-27

## 2025-03-27 RX ORDER — SYRINGE-NEEDLE,INSULIN,0.5 ML 27GX1/2"
1 SYRINGE, EMPTY DISPOSABLE MISCELLANEOUS 3 TIMES DAILY
Qty: 100 EACH | Refills: 2 | Status: SHIPPED | OUTPATIENT
Start: 2025-03-27

## 2025-03-27 RX ORDER — INSULIN GLARGINE 100 [IU]/ML
INJECTION, SOLUTION SUBCUTANEOUS
Qty: 15 ML | Refills: 5 | Status: SHIPPED | OUTPATIENT
Start: 2025-03-27

## 2025-03-27 NOTE — PATIENT INSTRUCTIONS
Pump Back Up Plan Only:     In the event of pump failure, remove your pump and start a long-acting insulin (such as Lantus, Basaglar, etc-whichever is preferred on your plan) at 45 units once daily. This will cover the loss of basal needs through your pump. Do not re-start your pump until the time your long-acting insulin would have been due.     Using a syringe, withdraw from Novolog to cover your meals and any high blood sugar corrections:      To determine how much to cover a meal or snack, total up your carbs and divide by 3 = how much to cover a meal or snack.     For example, you are having a 50 gram meal.  50 / 4 = 12.5. You would take 13 units to cover this meal.     To determine how much to correct a high blood sugar: Take your current blood sugar and subtract your target blood sugar, 150. Then, divide by 25 = how much to correct a high blood sugar.

## 2025-03-27 NOTE — PROGRESS NOTES
Patient ID: Victoria Plaza is a 64 y.o. female.  Patient's current PCP is Nohemi, Primary Doctor.   Collaborating Physician: ROBERT Kelley MD    Chief Complaint: Diabetes Mellitus    HPI  Victoria Plaza is a 64 y.o. Black or  female presenting for a new consult with me, previously seen by MELISSA Guzman NP  for diabetes.       Patient has been diagnosed with type 2 diabetes since age 34 .  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 Reports increased family stress due to multiple deaths in the family over the past month    Personal history of pancreatitis: denies  Personal history of abdominal surgery: denies  Personal history of thyroid surgery: denies  Family history of pancreatic cancer in first-degree relative: denies  Family history of MTC/MEN/endocrine tumors: denies       Past Medical History:   Diagnosis Date    Diabetes mellitus, type 2      Social History[1]    Review of patient's allergies indicates:   Allergen Reactions    Cephalexin Itching and Shortness Of Breath    Iodine and iodide containing products Hives, Itching and Shortness Of Breath    Latex, natural rubber Hives, Itching, Shortness Of Breath and Swelling     BREAKS OUT IN RASH      Monosodium glutamate Hives,  Itching, Shortness Of Breath and Swelling     CAN'T BREATHE      Propofol analogues Shortness Of Breath    Adhesive Hives and Itching       CURRENT DM MEDICATIONS:   Diabetes Medications              insulin aspart U-100 (NOVOLOG) 100 unit/mL injection Inject up to 120 units daily via insulin pump    insulin aspart, niacinamide, (FIASP FLEXTOUCH U-100 INSULIN) 100 unit/mL (3 mL) InPn For use with meals and corrections for pump back up plan    LANTUS SOLOSTAR U-100 INSULIN 100 unit/mL (3 mL) InPn pen In the event of pump failure only, remove pump and start Lantus 60 units daily. Do not re-start pump until the time Lantus would have been due.     Novolog per Tandem TSlim x2 insulin pump- since 09/2018 (Supplies-[employer]) Not in [employer]  Pump Profile settings - Profile 1 Active at upload  Time Basal Rate (units/hr) Correction Factor (units:mg/dL) Carb Ratio (units:grams) Target BG (mg/dL)  12:00 AM 2.100 1:28 1:3.4 150  2:00 AM   2.300 1:25 1:3.2 150  6:00 AM   2.100 1:22 1:3.4 150  12:00 PM 1.800 1:22 1:3.2 150  Total Daily Basal: 47.600 units Insulin Duration: 5 hr Carbohydrates:On     Pump supplies thru [employer]  Past failed treatment(s) include:   Humulin R,Lantus,Basaglar - Failure to control DM     Meter/cgm: Stu 2 - ADS (declines upgrade at this time)  Blood glucose testing is performed regularly.   Patient is testing continuously times per day.  Any episodes of hypoglycemia? Yes, see below  Glucose trends:   Per CGM download, for the last 14 days:  Average glucose of 159 mg/dL. Patient is 56% in range. 23% of readings are mildly elevated with 12% of readings > 250. 8% hypoglycemia. SD 48 mg/dL. Estimated GMI 7.1%. Glycemic control is unstable - Dropping throughout the day when not eating. Spiking with meals.      CGM data:      Her blood sugar in the clinic today was:   Lab Results   Component Value Date    POCGLU 99 03/27/2025       Statin: Taking  ACE/ARB: Taking    Labs reviewed and are noted  "below.    Screening or Prevention Patient's value Goal Complete/Controlled?   HgA1C Testing and Control   Lab Results   Component Value Date    HGBA1C 8.6 (H) 08/31/2024      Annually/Less than 8%  Reports recent A1c of 8% with pcp No   Lipid profile : 09/24/2024 Annually Yes   LDL control Lab Results   Component Value Date    LDLCALC 130 (H) 09/24/2024    Annually/Less than 100 mg/dl  No   Nephropathy screening Lab Results   Component Value Date    MICALBCREAT 60 (H) 09/02/2021     Lab Results   Component Value Date    PROTEINUA Trace (A) 01/01/2018    Annually No   Blood pressure BP Readings from Last 1 Encounters:   03/27/25 (!) 155/77    Less than 140/90 No   Dilated retinal exam : 07/27/2022 Annually Yes    Foot exam   : 07/25/2023 Annually No     Glucose   Date Value Ref Range Status   02/20/2024 122 (H) 70 - 110 mg/dL Final     Anion Gap   Date Value Ref Range Status   09/01/2024 9 8 - 16 mmol/L Final   02/20/2024 15 8 - 16 mmol/L Final     eGFR    Date Value Ref Range Status   09/01/2024 54 mL/min/1.73mSq Final     Comment:     In accordance with NKF-ASN Task Force recommendation, calculation based on the Chronic Kidney Disease Epidemiology Collaboration (CKD-EPI) equation without adjustment for race. eGFR adjusted for gender and age and calculated in ml/min/1.73mSquared. eGFR cannot be calculated if patient is under 18 years of age.     Reference Range:   >= 60 ml/min/1.73mSquared.     eGFR if non    Date Value Ref Range Status   01/20/2022 69 >59 mL/min/1.73 Final     Lab Results   Component Value Date    CPEPTIDE 0.57 (L) 02/20/2024    FREET4 0.86 02/20/2024    TSH 2.370 12/16/2024    THYROPEROXID <8 09/01/2021     Lab Results   Component Value Date    CPEPTIDE 0.57 (L) 02/20/2024     No results found for: "GLUTAMICACID"    Wt Readings from Last 3 Encounters:   03/27/25 1411 94.4 kg (208 lb 1.8 oz)   12/17/24 1026 94.6 kg (208 lb 8.9 oz)   09/19/24 1023 94.1 kg (207 lb 7.3 " oz)       Review of Systems   Constitutional:  Negative for malaise/fatigue and weight loss.   Eyes:  Negative for blurred vision and double vision.   Respiratory:  Negative for shortness of breath.    Cardiovascular: Negative.    Gastrointestinal: Negative.    Genitourinary:  Negative for frequency.   Musculoskeletal:  Negative for myalgias.   Neurological: Negative.    Psychiatric/Behavioral: Negative.         Physical Exam  Vitals reviewed.   Constitutional:       General: She is not in acute distress.     Appearance: Normal appearance. She is obese.   Eyes:      Conjunctiva/sclera: Conjunctivae normal.      Pupils: Pupils are equal, round, and reactive to light.   Neck:      Thyroid: No thyroid mass, thyromegaly or thyroid tenderness.      Vascular: No carotid bruit.   Cardiovascular:      Rate and Rhythm: Normal rate and regular rhythm.      Pulses: Normal pulses.      Heart sounds: Normal heart sounds. No murmur heard.  Pulmonary:      Effort: Pulmonary effort is normal.      Breath sounds: Normal breath sounds.   Abdominal:      General: Bowel sounds are normal.      Palpations: Abdomen is soft.   Musculoskeletal:      Cervical back: Normal range of motion and neck supple.      Right lower leg: No edema.      Left lower leg: No edema.   Skin:     General: Skin is warm and dry.      Comments: Sites without hypertrophy and/or infection   Neurological:      General: No focal deficit present.      Mental Status: She is alert and oriented to person, place, and time.   Psychiatric:         Mood and Affect: Mood is depressed.         Behavior: Behavior normal.           Assessment & Plan    Victoria was seen today for diabetes mellitus.    Diagnoses and all orders for this visit:    Uncontrolled type 2 diabetes mellitus with hyperglycemia, with long-term current use of insulin  -     POCT Glucose, Hand-Held Device  -     LANTUS SOLOSTAR U-100 INSULIN 100 unit/mL (3 mL) InPn pen; In the event of pump failure only,  "remove pump and start Lantus 45 units daily. Do not re-start pump until the time Lantus would have been due.  -     insulin aspart U-100 (NOVOLOG) 100 unit/mL injection; Inject up to 120 units daily via insulin pump  -     insulin syringe-needle U-100 1 mL 28 gauge x 1/2" Syrg; 1 each by Misc.(Non-Drug; Combo Route) route 3 (three) times daily.  - Changes made below:  Pump Profile settings - Profile 1 Active at upload  Time Basal Rate (units/hr) Correction Factor (units:mg/dL) Carb Ratio (units:grams) Target BG (mg/dL)        12:00 AM 2.000 1:28 1:3.4 150  2:00 AM   2.200 1:25 1:3.2 150  6:00 AM   2.000 1:22 1:2.5 150  12:00 PM 1.700 1:22 1:2.2 150  Total Daily Basal: 45.2 units Insulin Duration: 5 hr Carbohydrates:On    Insulin pump in place  -     POCT Glucose, Hand-Held Device  -     LANTUS SOLOSTAR U-100 INSULIN 100 unit/mL (3 mL) InPn pen; In the event of pump failure only, remove pump and start Lantus 45 units daily. Do not re-start pump until the time Lantus would have been due.  -     insulin aspart U-100 (NOVOLOG) 100 unit/mL injection; Inject up to 120 units daily via insulin pump  -     insulin syringe-needle U-100 1 mL 28 gauge x 1/2" Syrg; 1 each by Misc.(Non-Drug; Combo Route) route 3 (three) times daily.    Pump Back Up Plan Only:     In the event of pump failure, remove your pump and start a long-acting insulin (such as Lantus, Basaglar, etc-whichever is preferred on your plan) at 45 units once daily. This will cover the loss of basal needs through your pump. Do not re-start your pump until the time your long-acting insulin would have been due.     Using a syringe, withdraw from Novolog to cover your meals and any high blood sugar corrections:      To determine how much to cover a meal or snack, total up your carbs and divide by 3 = how much to cover a meal or snack.    To determine how much to correct a high blood sugar: Take your current blood sugar and subtract your target blood sugar, 150. Then, " divide by 25 = how much to correct a high blood sugar.       Hypothyroidism, unspecified type - stable on current replacement    Essential hypertension - > goal today, anxious, on arb  - Monitor      - Reviewed with patient and daughter:  The basic pathophysiology of Type 1 diabetes  Mechanism of action and action time of each insulin  Use of insulin pens/site selection, prep and rotation  Use of home glucose monitor/cgm  Basic diet/carbohydrate counting/avoiding simple sugars  Hypoglycemia signs and proper treatment  Use of glucagon  Hyperglycemia/ketones/sick day management  Proper hydration and rest  When to call for assistance          - Follow up: 3 months    Visit today included increased complexity associated with the care of the episodic problem GLUCOSE CONTROL addressed and managing the longitudinal care of the patient due to the serious and/or complex managed problem(s) DIABETES.                   [1]   Social History  Socioeconomic History    Marital status: Single   Tobacco Use    Smoking status: Never    Smokeless tobacco: Never   Substance and Sexual Activity    Alcohol use: Not Currently    Drug use: Never    Sexual activity: Not Currently     Social Drivers of Health     Financial Resource Strain: Unknown (8/11/2020)    Received from [facility] Subsidi[facility] and Affili[facility]    Overall Financial Resource Strain (CARDIA)     Difficulty of Paying Living Expenses: Patient declined   Food Insecurity: Unknown (8/11/2020)    Received from [facility] and [facility]    Hunger Vital Sign     Worried About Running Out of Food in the Last Year: Patient declined     Ran Out of Food in the Last Year: Patient declined   Transportation Needs: Unknown (8/11/2020)    Received from [facility] and [facility]    PRAPARE - Transportation     Lack of  Transportation (Medical): Patient declined     Lack of Transportation (Non-Medical): Patient declined

## 2025-03-31 ENCOUNTER — PATIENT MESSAGE (OUTPATIENT)
Dept: DIABETES | Facility: CLINIC | Age: 65
End: 2025-03-31
Payer: MEDICARE

## 2025-03-31 DIAGNOSIS — Z96.41 INSULIN PUMP IN PLACE: ICD-10-CM

## 2025-03-31 DIAGNOSIS — E11.65 UNCONTROLLED TYPE 2 DIABETES MELLITUS WITH HYPERGLYCEMIA, WITH LONG-TERM CURRENT USE OF INSULIN: ICD-10-CM

## 2025-03-31 DIAGNOSIS — Z79.4 UNCONTROLLED TYPE 2 DIABETES MELLITUS WITH HYPERGLYCEMIA, WITH LONG-TERM CURRENT USE OF INSULIN: ICD-10-CM

## 2025-03-31 RX ORDER — INSULIN ASPART 100 [IU]/ML
INJECTION, SOLUTION INTRAVENOUS; SUBCUTANEOUS
Qty: 40 ML | Refills: 11 | Status: SHIPPED | OUTPATIENT
Start: 2025-03-31

## 2025-03-31 NOTE — TELEPHONE ENCOUNTER
----- Message from [employer] sent at 3/31/2025  8:53 AM CDT -----  Contact: daughter  ...Type:  Patient Returning CallWho Called:Delmy Who Left Message for Patient:Does the patient know what this is regarding?:yes Would the patient rather a call back or a response via [facility]? Call back Best Call Back Number:. [phone] Additional Information: pt daughter is returning an missed call.

## 2025-03-31 NOTE — TELEPHONE ENCOUNTER
Speak with pt daughter about low BS over the weekend. Per pt Stu, BS reading was 46. Pt was sweaty, not alert and hard to wake up. Pt daughter stated she called 911. Fire department did finger stick, BS reading 20. Per daughter, EMT arrived and gave pt insulin and IV. Pt did not go to ER or Hospital for treatment. Pt was treated at home by EMT for over 1 hour. BS went up to 65. Pt BS is still have lows even when eating carbs. Scheduled next available with Adela on tomorrow to discuss    Novolog script will need to be resent to [facility]. Pt low on pump insulin

## 2025-03-31 NOTE — TELEPHONE ENCOUNTER
Daughter reports overall 30 mg/dl discrepancy in CBG and Stu sensor readings, but continues to have hypoglycemia today.  Has been confirming with CBG.    Current readings: Stu 132, .     Will decrease all basal rates (overall ~25% decrease). She has appointment with Adela tomorrow.     12:00 AM 1.500 1:28 1:3.4 150  2:00 AM   1.700 1:25 1:3.2 150  6:00 AM   1.500 1:22 1:2.5 150  12:00 PM 1.200 1:22 1:2.2 150

## 2025-04-01 ENCOUNTER — OFFICE VISIT (OUTPATIENT)
Dept: DIABETES | Facility: CLINIC | Age: 65
End: 2025-04-01
Payer: MEDICARE

## 2025-04-01 DIAGNOSIS — E10.649 HYPOGLYCEMIA DUE TO TYPE 1 DIABETES MELLITUS: ICD-10-CM

## 2025-04-01 DIAGNOSIS — Z96.41 INSULIN PUMP IN PLACE: ICD-10-CM

## 2025-04-01 DIAGNOSIS — E11.65 UNCONTROLLED TYPE 2 DIABETES MELLITUS WITH HYPERGLYCEMIA, WITH LONG-TERM CURRENT USE OF INSULIN: Primary | ICD-10-CM

## 2025-04-01 DIAGNOSIS — Z79.4 UNCONTROLLED TYPE 2 DIABETES MELLITUS WITH HYPERGLYCEMIA, WITH LONG-TERM CURRENT USE OF INSULIN: Primary | ICD-10-CM

## 2025-04-01 RX ORDER — GLUCAGON INJECTION, SOLUTION 1 MG/.2ML
1 INJECTION, SOLUTION SUBCUTANEOUS
Qty: 1 EACH | Refills: 11 | Status: SHIPPED | OUTPATIENT
Start: 2025-04-01

## 2025-04-01 NOTE — PROGRESS NOTES
The patient location is: [facility]  The chief complaint leading to consultation is: diabetes    Visit type: audiovisual    Face to Face time with patient: 16 minutes  20 minutes of total time spent on the encounter, which includes face to face time and non-face to face time preparing to see the patient (eg, review of tests), Obtaining and/or reviewing separately obtained history, Documenting clinical information in the electronic or other health record, Independently interpreting results (not separately reported) and communicating results to the patient/family/caregiver, or Care coordination (not separately reported).         Each patient to whom he or she provides medical services by telemedicine is:  (1) informed of the relationship between the physician and patient and the respective role of any other health care provider with respect to management of the patient; and (2) notified that he or she may decline to receive medical services by telemedicine and may withdraw from such care at any time.    Notes:       Patient ID: Victoira Plaza is a 64 y.o. female.  Patient's current PCP is No, Primary Doctor.   Collaborating Physician: ROBERT Kelley MD    Chief Complaint: No chief complaint on file.    HPI  Victoria Plaza is a 64 y.o. Black or  female presenting for a follow up for diabetes.       Patient has been diagnosed with type 2 diabetes since age 34 .  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  LMP: ---    Current diet: Putting 45 grams carb into pump for meals  Current weight: 208#  Weight at FOV: 208#  Activity level: None - very sedentary    Changes made at the last visit: per [facility]  Pump Profile settings - Profile 1 Active at upload  Time Basal Rate  (units/hr) Correction Factor (units:mg/dL) Carb Ratio (units:grams) Target BG (mg/dL)        12:00 AM 2.000 1:28 1:3.4 150  2:00 AM   2.200 1:25 1:3.2 150  6:00 AM   2.000 1:22 1:2.5 150  12:00 PM 1.700 1:22 1:2.2 150  Total Daily Basal: 45.2 units Insulin Duration: 5 hr Carbohydrates:On    Current issues: Episode of sever hypoglycemia over the weekend. Daughter reports being unable to wake patient in the am. CBG = 40. Ems called - given glucagon and what sounds like IV dextrose. Slow recovery. Patient was not taken to ED. Basal rates decreased at last visit last week. Lowered by 25% per this department. Daughter states blood sugars better over night. Denies increased activity, etoh intake or incorrect bolus prior to hypoglycemia.     Personal history of pancreatitis: denies  Personal history of abdominal surgery: denies  Personal history of thyroid surgery: denies  Family history of pancreatic cancer in first-degree relative: denies  Family history of MTC/MEN/endocrine tumors: denies       Past Medical History:   Diagnosis Date    Diabetes mellitus, type 2      Social History[1]    Review of patient's allergies indicates:   Allergen Reactions    Cephalexin Itching and Shortness Of Breath    Iodine and iodide containing products Hives, Itching and Shortness Of Breath    Latex, natural rubber Hives, Itching, Shortness Of Breath and Swelling     BREAKS OUT IN RASH      Monosodium glutamate Hives, Itching, Shortness Of Breath and Swelling     CAN'T BREATHE      Propofol analogues Shortness Of Breath    Adhesive Hives and Itching       CURRENT DM MEDICATIONS:   Diabetes Medications              insulin aspart U-100 (NOVOLOG) 100 unit/mL injection Inject up to 120 units daily via insulin pump    insulin aspart, niacinamide, (FIASP FLEXTOUCH U-100 INSULIN) 100 unit/mL (3 mL) InPn For use with meals and corrections for pump back up plan    LANTUS SOLOSTAR U-100 INSULIN 100 unit/mL (3 mL) InPn pen In the event of pump  failure only, remove pump and start Lantus 60 units daily. Do not re-start pump until the time Lantus would have been due.     Novolog per Tandem TSlim x2 insulin pump- since 09/2018 (Supplies-[employer]) Not in [employer]/download at visits  Pump Profile settings - Profile 1 Active   Time Basal Rate (units/hr) Correction Factor (units:mg/dL) Carb Ratio (units:grams) Target BG (mg/dL)        12:00 AM 2.000 1:28 1:3.4 150  2:00 AM   2.200 1:25 1:3.2 150  6:00 AM   2.000 1:22 1:2.5 150  12:00 PM 1.700 1:22 1:2.2 150  Total Daily Basal: 45.2 units Insulin Duration: 5 hr Carbohydrates:On    Pump supplies thru [employer]    Past failed treatment(s) include:   Humulin R,Lantus,Basaglar - Failure to control DM     Meter/cgm: Stu 2 - ADS (declines upgrade at this time)- uses   Blood glucose testing is performed regularly.   Patient is testing continuously times per day.  Any episodes of hypoglycemia? Yes, see below  Glucose trends:   Unable to access glucose trends due to not having  to download. Daughter reports SMBG 30-40 mg/dl higher than CGM.  Reports blood sugars more stable with changes made yesterday to basals.    CGM data:  Not available    Her blood sugar in the clinic today was: NA  Lab Results   Component Value Date    POCGLU 99 03/27/2025       Statin: Taking  ACE/ARB: Taking    Labs reviewed and are noted below.    Screening or Prevention Patient's value Goal Complete/Controlled?   HgA1C Testing and Control   Lab Results   Component Value Date    HGBA1C 8.6 (H) 08/31/2024      Annually/Less than 8%  Reports recent A1c of 8% with pcp No   Lipid profile : 09/24/2024 Annually Yes   LDL control Lab Results   Component Value Date    LDLCALC 130 (H) 09/24/2024    Annually/Less than 100 mg/dl  No   Nephropathy screening Lab Results   Component Value Date    MICALBCREAT 60 (H) 09/02/2021     Lab Results   Component Value Date    PROTEINUA Trace (A) 01/01/2018    Annually No   Blood pressure BP Readings  "from Last 1 Encounters:   03/27/25 (!) 155/77    Less than 140/90 No   Dilated retinal exam : 07/27/2022 Annually Yes    Foot exam   : 07/25/2023 Annually No     Glucose   Date Value Ref Range Status   02/20/2024 122 (H) 70 - 110 mg/dL Final     Anion Gap   Date Value Ref Range Status   09/01/2024 9 8 - 16 mmol/L Final   02/20/2024 15 8 - 16 mmol/L Final     eGFR    Date Value Ref Range Status   09/01/2024 54 mL/min/1.73mSq Final     Comment:     In accordance with NKF-ASN Task Force recommendation, calculation based on the Chronic Kidney Disease Epidemiology Collaboration (CKD-EPI) equation without adjustment for race. eGFR adjusted for gender and age and calculated in ml/min/1.73mSquared. eGFR cannot be calculated if patient is under 18 years of age.     Reference Range:   >= 60 ml/min/1.73mSquared.     eGFR if non    Date Value Ref Range Status   01/20/2022 69 >59 mL/min/1.73 Final     Lab Results   Component Value Date    CPEPTIDE 0.57 (L) 02/20/2024    FREET4 0.86 02/20/2024    TSH 2.370 12/16/2024    THYROPEROXID <8 09/01/2021     Lab Results   Component Value Date    CPEPTIDE 0.57 (L) 02/20/2024     No results found for: "GLUTAMICACID"    Wt Readings from Last 3 Encounters:   03/27/25 1411 94.4 kg (208 lb 1.8 oz)   12/17/24 1026 94.6 kg (208 lb 8.9 oz)   09/19/24 1023 94.1 kg (207 lb 7.3 oz)       Review of Systems   Constitutional:  Negative for malaise/fatigue and weight loss.   Eyes:  Negative for blurred vision and double vision.   Respiratory:  Negative for shortness of breath.    Cardiovascular: Negative.    Gastrointestinal: Negative.    Genitourinary:  Negative for frequency.   Musculoskeletal:  Negative for myalgias.   Neurological: Negative.    Psychiatric/Behavioral: Negative.         Physical Exam  Vitals reviewed.   Constitutional:       General: She is not in acute distress.     Appearance: Normal appearance. She is obese.   Eyes:      Conjunctiva/sclera: " Conjunctivae normal.   Pulmonary:      Effort: Pulmonary effort is normal.   Neurological:      General: No focal deficit present.      Mental Status: She is alert and oriented to person, place, and time.   Psychiatric:         Mood and Affect: Mood is depressed.         Behavior: Behavior normal.           Assessment & Plan    Uncontrolled type 2 diabetes mellitus with hyperglycemia, with long-term current use of insulin    Insulin pump in place    Hypoglycemia due to type 1 diabetes mellitus  -     glucagon (GVOKE HYPOPEN 2-PACK) 1 mg/0.2 mL AtIn; Inject 1 mg into the skin as needed (For severe low blood sugar).  Dispense: 1 each; Refill: 11    Unable to review insulin delivery via pump prior to hypoglycemic episode. Will continue with 25% basal decrease as well as lowering bolus dosing as below:  Change carb ratio at all times to 5  Change correction factor at all times to 30  Eat 3 meals per day  Call with blood sugars on Friday  Reviewed Glucagon use and treatment of severe hypoglycemia        - Reviewed with patient and daughter:  The basic pathophysiology of Type 1 diabetes  Mechanism of action and action time of each insulin  Use of insulin pens/site selection, prep and rotation  Use of home glucose monitor/cgm  Basic diet/carbohydrate counting/avoiding simple sugars  Hypoglycemia signs and proper treatment  Use of glucagon  Hyperglycemia/ketones/sick day management  Proper hydration and rest  When to call for assistance          - Follow up: 3 months    Visit today included increased complexity associated with the care of the episodic problem GLUCOSE CONTROL addressed and managing the longitudinal care of the patient due to the serious and/or complex managed problem(s) DIABETES.                     [1]   Social History  Socioeconomic History    Marital status: Single   Tobacco Use    Smoking status: Never    Smokeless tobacco: Never   Substance and Sexual Activity    Alcohol use: Not Currently    Drug use:  Never    Sexual activity: Not Currently     Social Drivers of Health     Financial Resource Strain: Unknown (8/11/2020)    Received from [facility] Its Subsidiaries and Affiliates    Overall Financial Resource Strain (CARDIA)     Difficulty of Paying Living Expenses: Patient declined   Food Insecurity: Unknown (8/11/2020)    Received from [facility] Subsidiaries and Affiliates    Hunger Vital Sign     Worried About Running Out of Food in the Last Year: Patient declined     Ran Out of Food in the Last Year: Patient declined   Transportation Needs: Unknown (8/11/2020)    Received from [facility] Subsidiaries and Affiliates    PRAPARE - Transportation     Lack of Transportation (Medical): Patient declined     Lack of Transportation (Non-Medical): Patient declined

## 2025-04-04 ENCOUNTER — PATIENT MESSAGE (OUTPATIENT)
Dept: DIABETES | Facility: CLINIC | Age: 65
End: 2025-04-04
Payer: MEDICARE

## 2025-05-02 ENCOUNTER — TELEPHONE (OUTPATIENT)
Dept: DIABETES | Facility: CLINIC | Age: 65
End: 2025-05-02
Payer: MEDICARE

## 2025-05-02 DIAGNOSIS — E11.65 UNCONTROLLED TYPE 2 DIABETES MELLITUS WITH HYPERGLYCEMIA, WITH LONG-TERM CURRENT USE OF INSULIN: ICD-10-CM

## 2025-05-02 DIAGNOSIS — Z96.41 INSULIN PUMP IN PLACE: ICD-10-CM

## 2025-05-02 DIAGNOSIS — Z79.4 UNCONTROLLED TYPE 2 DIABETES MELLITUS WITH HYPERGLYCEMIA, WITH LONG-TERM CURRENT USE OF INSULIN: ICD-10-CM

## 2025-05-02 RX ORDER — INSULIN GLARGINE 100 [IU]/ML
INJECTION, SOLUTION SUBCUTANEOUS
Qty: 15 ML | Refills: 5 | Status: SHIPPED | OUTPATIENT
Start: 2025-05-02 | End: 2025-05-02

## 2025-05-02 RX ORDER — INSULIN GLARGINE-YFGN 100 [IU]/ML
INJECTION, SOLUTION SUBCUTANEOUS
Qty: 15 ML | Refills: 2 | Status: SHIPPED | OUTPATIENT
Start: 2025-05-02

## 2025-05-02 NOTE — TELEPHONE ENCOUNTER
PA outcome: Denied.     REASON: This drug is not covered on the formulary. We are denying your request because we do not show that you have tried at least 2 covered drugs that can treat your condition. You have already tried this formulary alternative: Insulin aspart 100u/mL. Other covered drug(s) is/are: insulin degludec subcutaneous insulin pen 100 unit/mL (3 mL), insulin degludec subcutaneous insulin pen 200 unit/mL (3 mL), insulin degludec subcutaneous solution 100 unit/mL, insulin glargine U-300 conc subcutaneous insulin pen 300 unit/mL (1.5 mL), insulin glargine U-300 conc subcutaneous insulin pen 300 unit/mL (3 mL), insulin glargine-yfgn subcutaneous insulin pen 100 unit/mL (3 mL). We may be able to make an exception to cover this drug. Your doctor will need to send us medical records showing that you tried this drug. If you cannot take the covered drug, your doctor will need to tell us why. Note: Some covered drug(s) may have quantity limits. Please refer to the formulary for details.

## 2025-05-02 NOTE — TELEPHONE ENCOUNTER
Per [facility], pt insurance preferred is insulin glargine. Can you please change Lantus script and resend.

## 2025-05-05 ENCOUNTER — TELEPHONE (OUTPATIENT)
Dept: DIABETES | Facility: CLINIC | Age: 65
End: 2025-05-05
Payer: MEDICARE

## 2025-05-05 DIAGNOSIS — Z79.4 UNCONTROLLED TYPE 2 DIABETES MELLITUS WITH HYPERGLYCEMIA, WITH LONG-TERM CURRENT USE OF INSULIN: Primary | ICD-10-CM

## 2025-05-05 DIAGNOSIS — E11.65 UNCONTROLLED TYPE 2 DIABETES MELLITUS WITH HYPERGLYCEMIA, WITH LONG-TERM CURRENT USE OF INSULIN: Primary | ICD-10-CM

## 2025-05-05 RX ORDER — INSULIN GLARGINE 300 [IU]/ML
INJECTION, SOLUTION SUBCUTANEOUS
Qty: 4.6 ML | Refills: 11 | Status: SHIPPED | OUTPATIENT
Start: 2025-05-05

## 2025-05-05 NOTE — TELEPHONE ENCOUNTER
Attempt to call patient to inform  Toujeo sent based on preferred meds. Please let patient know.      No answer left detail message to give us a call back

## 2025-05-05 NOTE — TELEPHONE ENCOUNTER
PA denied for LANTUS    Reason: pt has only tried one of the acceptable alternatives.      Denial letter uploaded to media and routed to Ms. Mascorro.

## 2025-05-13 DIAGNOSIS — E11.65 UNCONTROLLED TYPE 2 DIABETES MELLITUS WITH HYPERGLYCEMIA, WITH LONG-TERM CURRENT USE OF INSULIN: ICD-10-CM

## 2025-05-13 DIAGNOSIS — Z79.4 UNCONTROLLED TYPE 2 DIABETES MELLITUS WITH HYPERGLYCEMIA, WITH LONG-TERM CURRENT USE OF INSULIN: ICD-10-CM

## 2025-05-13 NOTE — TELEPHONE ENCOUNTER
Script written for TROY DUNN, but has LANTUS in the instructions. Pharmacy asking for clarification, message routed to Ms. Andres

## 2025-05-14 RX ORDER — INSULIN GLARGINE 300 [IU]/ML
INJECTION, SOLUTION SUBCUTANEOUS
Qty: 4.6 ML | Refills: 11 | Status: SHIPPED | OUTPATIENT
Start: 2025-05-14

## 2025-05-27 ENCOUNTER — PATIENT MESSAGE (OUTPATIENT)
Dept: DIABETES | Facility: CLINIC | Age: 65
End: 2025-05-27
Payer: MEDICARE

## 2025-06-04 DIAGNOSIS — E03.9 HYPOTHYROIDISM, UNSPECIFIED TYPE: ICD-10-CM

## 2025-06-04 RX ORDER — LEVOTHYROXINE SODIUM 100 UG/1
100 TABLET ORAL
Qty: 30 TABLET | Refills: 5 | Status: SHIPPED | OUTPATIENT
Start: 2025-06-04

## 2025-06-05 ENCOUNTER — PATIENT MESSAGE (OUTPATIENT)
Dept: DIABETES | Facility: CLINIC | Age: 65
End: 2025-06-05
Payer: MEDICARE

## 2025-06-05 ENCOUNTER — TELEPHONE (OUTPATIENT)
Dept: DIABETES | Facility: CLINIC | Age: 65
End: 2025-06-05
Payer: MEDICARE

## 2025-06-11 ENCOUNTER — TELEPHONE (OUTPATIENT)
Dept: DIABETES | Facility: CLINIC | Age: 65
End: 2025-06-11
Payer: MEDICARE

## 2025-06-11 NOTE — TELEPHONE ENCOUNTER
"Insulin orders emailed to Sunshine SUTHERLAND at "[email or web address] <[email or web address]  "

## 2025-06-11 NOTE — TELEPHONE ENCOUNTER
----- Message from Adela Mascorro NP sent at 6/10/2025  5:07 PM CDT -----  Please send updated orders to Sunshine SUTHERLAND;Let's start the new insulin orders as below tomorrow am: Lantus/Toujeo 10 units every morning Fiasp:check blood sugar before meals, If bs is:< 200 - no [id number] - 2 [id number] - 3 units> 400     - 4 units Send readings on Friday please - Adela

## 2025-07-31 ENCOUNTER — DOCUMENT SCAN (OUTPATIENT)
Dept: HOME HEALTH SERVICES | Facility: HOSPITAL | Age: 65
End: 2025-07-31
Payer: MEDICARE

## 2025-09-04 ENCOUNTER — TELEPHONE (OUTPATIENT)
Dept: DIABETES | Facility: CLINIC | Age: 65
End: 2025-09-04
Payer: MEDICARE